# Patient Record
Sex: MALE | Race: WHITE | HISPANIC OR LATINO | Employment: UNEMPLOYED | URBAN - METROPOLITAN AREA
[De-identification: names, ages, dates, MRNs, and addresses within clinical notes are randomized per-mention and may not be internally consistent; named-entity substitution may affect disease eponyms.]

---
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
## 2018-01-04 ENCOUNTER — OFFICE VISIT (OUTPATIENT)
Dept: URGENT CARE | Facility: URGENT CARE | Age: 18
End: 2018-01-04
Payer: COMMERCIAL

## 2018-01-04 VITALS
BODY MASS INDEX: 29.43 KG/M2 | OXYGEN SATURATION: 94 % | TEMPERATURE: 103 F | DIASTOLIC BLOOD PRESSURE: 80 MMHG | SYSTOLIC BLOOD PRESSURE: 127 MMHG | WEIGHT: 199.4 LBS | HEART RATE: 60 BPM

## 2018-01-04 DIAGNOSIS — H65.91 OME (OTITIS MEDIA WITH EFFUSION), RIGHT: ICD-10-CM

## 2018-01-04 DIAGNOSIS — R11.2 INTRACTABLE VOMITING WITH NAUSEA, UNSPECIFIED VOMITING TYPE: Primary | ICD-10-CM

## 2018-01-04 PROCEDURE — 99214 OFFICE O/P EST MOD 30 MIN: CPT | Performed by: PHYSICIAN ASSISTANT

## 2018-01-04 RX ORDER — ONDANSETRON 4 MG/1
4-8 TABLET, ORALLY DISINTEGRATING ORAL EVERY 8 HOURS PRN
Qty: 20 TABLET | Refills: 1 | Status: SHIPPED | OUTPATIENT
Start: 2018-01-04 | End: 2022-11-01

## 2018-01-04 RX ORDER — AMOXICILLIN 875 MG
875 TABLET ORAL 2 TIMES DAILY
Qty: 20 TABLET | Refills: 0 | Status: SHIPPED | OUTPATIENT
Start: 2018-01-04 | End: 2018-01-14

## 2018-01-04 ASSESSMENT — ENCOUNTER SYMPTOMS
MUSCULOSKELETAL NEGATIVE: 1
GASTROINTESTINAL NEGATIVE: 1
EYES NEGATIVE: 1
PSYCHIATRIC NEGATIVE: 1
NEUROLOGICAL NEGATIVE: 1
CARDIOVASCULAR NEGATIVE: 1

## 2018-01-04 NOTE — MR AVS SNAPSHOT
After Visit Summary   1/4/2018    Martín Alvarez    MRN: [id number]           Patient Information     Date Of Birth          2000        Visit Information        Provider Department      1/4/2018 8:00 PM Naomi Carvalho PA-C [facility]        Today's Diagnoses     Intractable vomiting with nausea, unspecified vomiting type    -  1    OME (otitis media with effusion), right           Follow-ups after your visit        Your next 10 appointments already scheduled     Mar 01, 2018  1:00 PM CST   LAB with  LAB   [facility] ([facility])    [address] [place] [address]  1st [facility] [id number] [phone]           Please do not eat 10-12 hours before your appointment if you are coming in fasting for labs on lipids, cholesterol, or glucose (sugar). This does not apply to pregnant women. Water, hot tea and black coffee (with nothing added) are okay. Do not drink other fluids, diet soda or chew gum.            Mar 01, 2018  2:00 PM CST   (Arrive by 1:45 PM)   Return / with Ciro Muñoz MD   [facility] ([facility])    [address] [place] [address]  3rd [facility] [id number] [phone]            Mar 01, 2018  2:40 PM CST   (Arrive by 2:25 PM)   Return Visit with FRANCISCO Benito CNP   [facility] ([facility])    [address] [place] [address]  4th [facility] [id number] [phone]            Mar 02, 2018  1:30 PM CST   (Arrive by 1:15 PM)   Return / with Ciro Muñoz MD   [facility] ([facility])    [address] [place] [address]  3rd [facility] [id number] [phone]              Who to contact     If you have questions or need follow up information about today's clinic visit or your schedule please contact [facility] directly at  "[phone].  Normal or non-critical lab and imaging results will be communicated to you by MyChart, letter or phone within 4 business days after the clinic has received the results. If you do not hear from us within 7 days, please contact the clinic through [facility] or phone. If you have a critical or abnormal lab result, we will notify you by phone as soon as possible.  Submit refill requests through [employer] or call your pharmacy and they will forward the refill request to us. Please allow 3 business days for your refill to be completed.          Additional Information About Your Visit        [employer] Information     [employer] lets you send messages to your doctor, view your test results, renew your prescriptions, schedule appointments and more. To sign up, go to [email or web address] . Click on \"Log in\" on the left side of the screen, which will take you to the Welcome page. Then click on \"Sign up Now\" on the right side of the page.     You will be asked to enter the access code listed below, as well as some personal information. Please follow the directions to create your username and password.     Your access code is: [email or web address]  Expires: 2018  9:03 AM     Your access code will  in 90 days. If you need help or a new code, please call your [place] clinic or [phone].        Care EveryWhere ID     This is your Care EveryWhere ID. This could be used by other organizations to access your [facility] medical records  [id number]        Your Vitals Were     Pulse Temperature Pulse Oximetry BMI (Body Mass Index)          60 103  F (39.4  C) (Oral) 94% 29.43 kg/m2         Blood Pressure from Last 3 Encounters:   18 127/80   17 115/67   17 116/80    Weight from Last 3 Encounters:   18 199 lb 6.4 oz (90.4 kg) (92 %)*   17 195 lb 12.8 oz (88.8 kg) (91 %)*   17 200 lb 9.6 oz (91 kg) (93 %)*     * Growth percentiles are based on CDC 2-20 Years data.              Today, you " had the following     No orders found for display         Today's Medication Changes          These changes are accurate as of: 1/4/18 11:59 PM.  If you have any questions, ask your nurse or doctor.               Start taking these medicines.        Dose/Directions    amoxicillin 875 MG tablet   Commonly known as:  AMOXIL   Used for:  OME (otitis media with effusion), right   Started by:  Naomi Carvalho PA-C        Dose:  875 mg   Take 1 tablet (875 mg) by mouth 2 times daily for 10 days   Quantity:  20 tablet   Refills:  0       ondansetron 4 MG ODT tab   Commonly known as:  ZOFRAN ODT   Used for:  Intractable vomiting with nausea, unspecified vomiting type   Started by:  Naomi Carvalho PA-C        Dose:  4-8 mg   Take 1-2 tablets (4-8 mg) by mouth every 8 hours as needed for nausea   Quantity:  20 tablet   Refills:  1            Where to get your medicines      These medications were sent to [facility] [address] [place], [place] [address] AT [place] [address], [facility] [address]     Phone:  [phone]     amoxicillin 875 MG tablet    ondansetron 4 MG ODT tab                Primary Care Provider Fax #    Provider Not In System [phone]                Equal Access to Services     MINOO PEREZ AH: Hadii lamont ku hadasho Soomaali, waaxda luqadaha, qaybta kaalmada adeegyada, waxay francesin haymarleni marx. So [facility] [phone].    ATENCIÓN: Si habla español, tiene a shirley disposición servicios gratuitos de asistencia lingüística. Llame al [phone].    We comply with applicable federal civil rights laws and [place] laws. We do not discriminate on the basis of race, color, national origin, age, disability, sex, sexual orientation, or gender identity.            Thank you!     Thank you for choosing [facility]  for your care. Our goal is always to provide you with excellent care. Hearing back from our patients is one way  we can continue to improve our services. Please take a few minutes to complete the written survey that you may receive in the mail after your visit with us. Thank you!             Your Updated Medication List - Protect others around you: Learn how to safely use, store and throw away your medicines at www.disposemymeds.org.          This list is accurate as of: 1/4/18 11:59 PM.  Always use your most recent med list.                   Brand Name Dispense Instructions for use Diagnosis    amoxicillin 875 MG tablet    AMOXIL    20 tablet    Take 1 tablet (875 mg) by mouth 2 times daily for 10 days    OME (otitis media with effusion), right       * levETIRAcetam 750 MG tablet    KEPPRA    60 tablet    Take 1 tablet (750 mg) by mouth 2 times daily    Nonintractable generalized idiopathic epilepsy without status epilepticus (H)       * levETIRAcetam 250 MG tablet    KEPPRA    270 tablet    Take 2 tabs at AM along with 750 mg tab ( for total AM dose of 1250 ) and Take 1 tab at Pm along with 750 mg tab ( for total dose of 1000 mg PM dose ).    Nonintractable generalized idiopathic epilepsy without status epilepticus (H)       ondansetron 4 MG ODT tab    ZOFRAN ODT    20 tablet    Take 1-2 tablets (4-8 mg) by mouth every 8 hours as needed for nausea    Intractable vomiting with nausea, unspecified vomiting type       sertraline 100 MG tablet    ZOLOFT    90 tablet    Take 1 tablet (100 mg) by mouth daily    Anxiety       * Notice:  This list has 2 medication(s) that are the same as other medications prescribed for you. Read the directions carefully, and ask your doctor or other care provider to review them with you.

## 2018-01-05 NOTE — NURSING NOTE
"Chief Complaint   Patient presents with     Flu     Patient complains of flu symptoms       Initial /80 (BP Location: Left arm, Patient Position: Chair, Cuff Size: Adult Regular)  Pulse 60  Temp 103  F (39.4  C) (Oral)  Wt 199 lb 6.4 oz (90.4 kg)  SpO2 94%  BMI 29.43 kg/m2 Estimated body mass index is 29.43 kg/(m^2) as calculated from the following:    Height as of 11/28/17: 5' 9.02\" (1.753 m).    Weight as of this encounter: 199 lb 6.4 oz (90.4 kg).  Medication Reconciliation: complete       Yaneth Appiah    "

## 2018-01-05 NOTE — PROGRESS NOTES
SUBJECTIVE:   Martín Alvarez is a 17 year old male presenting with a chief complaint of   Chief Complaint   Patient presents with     Flu     Patient complains of flu symptoms   .    Onset of symptoms was 3 day(s) ago.  Course of illness is worsening.    Severity moderate  Current and Associated symptoms: fever, abdominal pain, sore throat, cough, runny nose  Denies   Treatment measures tried include OTC Cough med  Predisposing factors include None  History of PE tubes? No  Recent antibiotics? No    This started on Tuesday night  Nausea, fever, weak, cough, loss of appetite, vomiting, hard to keep medication down (Keppra), abdominal pain, some diarrhea, he is short of breath and has asthma   He has seizures - last one was in the past month     Review of Systems   Constitutional:        As in HPI   HENT:        As in HPI   Eyes: Negative.    Respiratory:        As in HPI   Cardiovascular: Negative.    Gastrointestinal: Negative.    Genitourinary: Negative.    Musculoskeletal: Negative.    Skin: Negative.    Neurological: Negative.    Psychiatric/Behavioral: Negative.          Past Medical History:   Diagnosis Date     Anxiety      Asthma      Depression      Seizures (H)      Current Outpatient Prescriptions   Medication Sig Dispense Refill     ondansetron (ZOFRAN ODT) 4 MG ODT tab Take 1-2 tablets (4-8 mg) by mouth every 8 hours as needed for nausea 20 tablet 1     amoxicillin (AMOXIL) 875 MG tablet Take 1 tablet (875 mg) by mouth 2 times daily for 10 days 20 tablet 0     levETIRAcetam (KEPPRA) 250 MG tablet Take 2 tabs at AM along with 750 mg tab ( for total AM dose of 1250 ) and Take 1 tab at Pm along with 750 mg tab ( for total dose of 1000 mg PM dose ). 270 tablet 3     levETIRAcetam (KEPPRA) 750 MG tablet Take 1 tablet (750 mg) by mouth 2 times daily 60 tablet 11     sertraline (ZOLOFT) 100 MG tablet Take 1 tablet (100 mg) by mouth daily (Patient taking differently: Take 100 mg by mouth daily ) 90 tablet 1      Social History   Substance Use Topics     Smoking status: Never Smoker     Smokeless tobacco: Never Used     Alcohol use No       OBJECTIVE  /80 (BP Location: Left arm, Patient Position: Chair, Cuff Size: Adult Regular)  Pulse 60  Temp 103  F (39.4  C) (Oral)  Wt 199 lb 6.4 oz (90.4 kg)  SpO2 94%  BMI 29.43 kg/m2    Physical Exam   Constitutional: He is oriented to person, place, and time and well-developed, well-nourished, and in no distress.   HENT:   Head: Normocephalic and atraumatic.   Right Ear: Tympanic membrane and ear canal normal.   Left Ear: Ear canal normal. Tympanic membrane is erythematous and bulging.   Eyes: Conjunctivae and EOM are normal. Pupils are equal, round, and reactive to light.   Neck: Normal range of motion. Neck supple.   Cardiovascular: Normal rate, regular rhythm and normal heart sounds.    Pulmonary/Chest: Effort normal and breath sounds normal.   Abdominal: Soft. Normal appearance. There is no tenderness.   Neurological: He is alert and oriented to person, place, and time. Gait normal.   Skin: Skin is warm and dry.   Psychiatric: Mood and affect normal.       Labs:  No results found for this or any previous visit (from the past 24 hour(s)).        ASSESSMENT:      ICD-10-CM    1. Intractable vomiting with nausea, unspecified vomiting type R11.2 ondansetron (ZOFRAN ODT) 4 MG ODT tab   2. OME (otitis media with effusion), right H65.91 amoxicillin (AMOXIL) 875 MG tablet        Medical Decision Making:    Zofran prescribed so he has better chance of taking Keppra  Amoxicillin for otitis media  Stay hydrated with fever  Close follow up if not improving  Tylenol/ibuprofen for fever    PLAN:    As above    Followup:    If not improving or if condition worsens, follow up with your Primary Care Provider    There are no Patient Instructions on file for this visit.    Naomi Carvalho PA-C

## 2018-01-29 ENCOUNTER — OFFICE VISIT (OUTPATIENT)
Dept: URGENT CARE | Facility: URGENT CARE | Age: 18
End: 2018-01-29
Payer: COMMERCIAL

## 2018-01-29 VITALS
OXYGEN SATURATION: 98 % | HEART RATE: 80 BPM | BODY MASS INDEX: 28.93 KG/M2 | TEMPERATURE: 97.6 F | DIASTOLIC BLOOD PRESSURE: 71 MMHG | SYSTOLIC BLOOD PRESSURE: 115 MMHG | WEIGHT: 196 LBS

## 2018-01-29 DIAGNOSIS — G40.909 RECURRENT SEIZURES (H): ICD-10-CM

## 2018-01-29 DIAGNOSIS — F43.23 ADJUSTMENT DISORDER WITH MIXED ANXIETY AND DEPRESSED MOOD: Primary | ICD-10-CM

## 2018-01-29 PROCEDURE — 36415 COLL VENOUS BLD VENIPUNCTURE: CPT | Performed by: PHYSICIAN ASSISTANT

## 2018-01-29 PROCEDURE — 80177 DRUG SCRN QUAN LEVETIRACETAM: CPT | Mod: 90 | Performed by: PHYSICIAN ASSISTANT

## 2018-01-29 PROCEDURE — 99214 OFFICE O/P EST MOD 30 MIN: CPT | Performed by: PHYSICIAN ASSISTANT

## 2018-01-29 PROCEDURE — 99000 SPECIMEN HANDLING OFFICE-LAB: CPT | Performed by: PHYSICIAN ASSISTANT

## 2018-01-29 NOTE — LETTER
[facility]  [address]  [facility] [address]  Phone: [phone]    January 29, 2018        Martín Alvarez  [address] [facility] [address]          To whom it may concern:    RE: Martín Alvarez    Patient was seen and treated today at our clinic today for panic attacks and recent seizure. Please excuse from work 1/25, 1/26, 1/29/2018.    Please contact me for questions or concerns.      Sincerely,        Marianne Quinn PA-C

## 2018-01-29 NOTE — PROGRESS NOTES
SUBJECTIVE:   Martín Alvarez is a 17 year old male who presents to clinic today for the following health issues:      Abnormal Mood Symptoms      Duration: last Thurs it got worse    Description:  Depression: no   Anxiety: YES  Panic attacks: YES     Accompanying signs and symptoms: see PHQ-9 and PALLAVI scores    History (similar episodes/previous evaluation): hx of anxiety and panic attacks    Precipitating or alleviating factors: None  Therapies tried and outcome: Zoloft 100mg (Sertraline), deep breathing   1/25 at work became very anxious. He told his manager and they sent him to the break room. He feels that they do not believe he has problems with anxiety and seizures.  Had a seizure 1/26/2018 at mom. Mom witnessed it and helped him through it. On Keppra, normal for him is 1 seizure per month. Last seizure before this was 1 month ago.    Current Outpatient Prescriptions   Medication Sig Dispense Refill     levETIRAcetam (KEPPRA) 250 MG tablet Take 2 tabs at AM along with 750 mg tab ( for total AM dose of 1250 ) and Take 1 tab at Pm along with 750 mg tab ( for total dose of 1000 mg PM dose ). 270 tablet 3     levETIRAcetam (KEPPRA) 750 MG tablet Take 1 tablet (750 mg) by mouth 2 times daily 60 tablet 11     sertraline (ZOLOFT) 100 MG tablet Take 1 tablet (100 mg) by mouth daily (Patient taking differently: Take 100 mg by mouth daily ) 90 tablet 1     ondansetron (ZOFRAN ODT) 4 MG ODT tab Take 1-2 tablets (4-8 mg) by mouth every 8 hours as needed for nausea (Patient not taking: Reported on 1/29/2018) 20 tablet 1     Allergies   Allergen Reactions     Seasonal Allergies      Itchy, red eyes         OBJECTIVE:  Blood pressure 115/71, pulse 80, temperature 97.6  F (36.4  C), temperature source Oral, weight 196 lb (88.9 kg), SpO2 98 %.  GENERAL: alert and in no apparent distress  MENTAL STATUS FINDINGS:  denies homicidal or suicidal behavior  PHQ score was 18 today, 12/2014 score was 20.  GAD7 score was 14 here  today. Last score was 18 in the year 2017.    ASSESSMENT:    ICD-10-CM    1. Adjustment disorder with mixed anxiety and depressed mood F43.23 [facility] REFERR[facility]   2. Recurrent seizures (H) G40.909 Keppra (Levetiracetam) Level       PLANS:Will check Keppra level for seizure, last one was 11/2017.  Behavioral Health Specialty referral. If they cannot seem to improve his symptoms he should f/u with Primary Provider or Psych for med adjustment. Also he wanted a work slip saying he has seizures and panic attacks so his work will believe him and not think he is lying. He thinks this will help with the anxiety.    Marianne Quinn PA-C

## 2018-01-29 NOTE — MR AVS SNAPSHOT
After Visit Summary   1/29/2018    Martín Alvarez    MRN: [id number]           Patient Information     Date Of Birth          2000        Visit Information        Provider Department      1/29/2018 3:45 PM Marianne Quinn PA-C [facility]        Today's Diagnoses     Adjustment disorder with mixed anxiety and depressed mood    -  1    Recurrent seizures (H)           Follow-ups after your visit        Additional Services     PRIMARY CARE INTEGRATED BEHAVIORAL HEALTH REFERRAL       Services are provided by a Behavioral Health Clinican (BHC) for [facility] patients' with co-occuring medical / behavioral health needs or mental health / substance use issues referred by Care Team Members (MTM and Care Coordinators)    Services can be provided in person / in clinic or telephonically for the [place]: [place], [place], Integrated Primary Care, and Complex Mobile Care Clinic patients.      Telephone / Consultation support can be provided to Care Team Members for [facility] patients.    BHC's will respond to routine orders within 3-5 business days.  BHC's will provide telephonic support to referred patients as indicated.    ~~~~~~~~~~~~~~~~~~~~~~~~~~~~~~~~~~~~~~~~~~~~~~~~~~~~~~~~~~~~~~~    Care Team Member creating referral: Mary Grace Quinn PA-C    REFERRAL REASON:    Worsening anxiety/panic attacks    Provide additional details for Behavioral Health Clinician to best meet patient's current needs:     Clinic Staff has discussed Behavioral Health Clinician Referral with the Patient/Caregiver: no                  Your next 10 appointments already scheduled     Mar 01, 2018  1:00 PM CST   LAB with  LAB    Health Lab ([facility])    [address] [place] [id number] [phone]           Please do not eat 10-12 hours before your appointment if you are coming in fasting for labs on lipids, cholesterol, or glucose (sugar). This does not apply to  "pregnant women. Water, hot tea and black coffee (with nothing added) are okay. Do not drink other fluids, diet soda or chew gum.            Mar 01, 2018  2:00 PM CST   (Arrive by 1:45 PM)   Return / with Ciro Muñoz MD   [facility] Neurology ([facility])    [address]  [address] [facility] [address]   [phone]            Mar 01, 2018  2:40 PM CST   (Arrive by 2:25 PM)   Return Visit with FRANCISCO Benito [facility] ([facility])    [address]  [address] [facility] [id number] [phone]            Mar 02, 2018  1:30 PM CST   (Arrive by 1:15 PM)   Return / with Ciro Muñoz MD   [facility] Neurology ([facility])    [address] [place] [address]  [address] [facility] [id number] [phone]              Who to contact     If you have questions or need follow up information about today's clinic visit or your schedule please contact [facility] directly at [phone].  Normal or non-critical lab and imaging results will be communicated to you by [email or web address], letter or phone within 4 business days after the clinic has received the results. If you do not hear from us within 7 days, please contact the clinic through [email or web address] or phone. If you have a critical or abnormal lab result, we will notify you by phone as soon as possible.  Submit refill requests through [employer] or call your pharmacy and they will forward the refill request to us. Please allow 3 business days for your refill to be completed.          Additional Information About Your Visit        [email or web address] Information     [employer]ifier lets you send messages to your doctor, view your test results, renew your prescriptions, schedule appointments and more. To sign up, go to [email or web address] . Click on \"Log in\" on the left side of the screen, which will take " "you to the Welcome page. Then click on \"Sign up Now\" on the right side of the page.     You will be asked to enter the access code listed below, as well as some personal information. Please follow the directions to create your username and password.     Your access code is: [email or web address]  Expires: 2018  9:03 AM     Your access code will  in 90 days. If you need help or a new code, please call your [facility] clinic or [phone].        Care EveryWhere ID     This is your Care EveryWhere ID. This could be used by other organizations to access your [facility] medical records  [id number]        Your Vitals Were     Pulse Temperature Pulse Oximetry BMI (Body Mass Index)          80 97.6  F (36.4  C) (Oral) 98% 28.93 kg/m2         Blood Pressure from Last 3 Encounters:   18 115/71   18 127/80   17 115/67    Weight from Last 3 Encounters:   18 196 lb (88.9 kg) (90 %)*   18 199 lb 6.4 oz (90.4 kg) (92 %)*   17 195 lb 12.8 oz (88.8 kg) (91 %)*     * Growth percentiles are based on CDC 2-20 Years data.              We Performed the Following     Keppra (Levetiracetam) Level     PRIMARY CARE [facility]TEGRATED [facility]HAV[facility]AL HE[facility] REFERRAL        Primary Care Provider Fax #    Provider Not In System [phone]                Equal Access to Services     TAMMI PEREZ : Hadii lamont martinezo Socorazon, waaxda luqadaha, qaybta kaalmada shruti, sudha gonzalez . So [facility] [phone].    ATENCIÓN: Si habla español, tiene a shirley disposición servicios gratuitos de asistencia lingüística. Llame al [phone].    We comply with applicable federal civil rights laws and [place] laws. We do not discriminate on the basis of race, color, national origin, age, disability, sex, sexual orientation, or gender identity.            Thank you!     Thank you for choosing [facility]  for your care. Our goal is always to provide you with excellent care. Hearing back " from our patients is one way we can continue to improve our services. Please take a few minutes to complete the written survey that you may receive in the mail after your visit with us. Thank you!             Your Updated Medication List - Protect others around you: Learn how to safely use, store and throw away your medicines at www.disposemymeds.org.          This list is accurate as of 1/29/18  4:23 PM.  Always use your most recent med list.                   Brand Name Dispense Instructions for use Diagnosis    * levETIRAcetam 750 MG tablet    KEPPRA    60 tablet    Take 1 tablet (750 mg) by mouth 2 times daily    Nonintractable generalized idiopathic epilepsy without status epilepticus (H)       * levETIRAcetam 250 MG tablet    KEPPRA    270 tablet    Take 2 tabs at AM along with 750 mg tab ( for total AM dose of 1250 ) and Take 1 tab at Pm along with 750 mg tab ( for total dose of 1000 mg PM dose ).    Nonintractable generalized idiopathic epilepsy without status epilepticus (H)       ondansetron 4 MG ODT tab    ZOFRAN ODT    20 tablet    Take 1-2 tablets (4-8 mg) by mouth every 8 hours as needed for nausea    Intractable vomiting with nausea, unspecified vomiting type       sertraline 100 MG tablet    ZOLOFT    90 tablet    Take 1 tablet (100 mg) by mouth daily    Anxiety       * Notice:  This list has 2 medication(s) that are the same as other medications prescribed for you. Read the directions carefully, and ask your doctor or other care provider to review them with you.

## 2018-01-31 LAB — LEVETIRACETAM SERPL-MCNC: 5 UG/ML (ref 12–46)

## 2018-02-22 DIAGNOSIS — F41.9 ANXIETY: ICD-10-CM

## 2018-02-22 NOTE — TELEPHONE ENCOUNTER
"Requested Prescriptions   Pending Prescriptions Disp Refills     sertraline (ZOLOFT) 100 MG tablet [Pharmacy Med Name: SERTRALINE 100MG TABLETS] 90 tablet 0    Last Written Prescription Date:  6/19/17  Last Fill Quantity: 90,  # refills: 1   Last office visit: 6/19/2017 with prescribing provider:     Future Office Visit:     Sig: TAKE 1 TABLET BY MOUTH DAILY    SSRIs Protocol Passed    2/22/2018  3:18 PM       Passed - Recent or future visit with authorizing provider    Patient had office visit in the last year or has a visit in the next 30 days with authorizing provider.  See \"Patient Info\" tab in inbasket, or \"Choose Columns\" in Meds & Orders section of the refill encounter.            Passed - Patient is age 18 or older          "

## 2018-02-23 RX ORDER — SERTRALINE HYDROCHLORIDE 100 MG/1
TABLET, FILM COATED ORAL
Qty: 30 TABLET | Refills: 0 | Status: SHIPPED | OUTPATIENT
Start: 2018-02-23 | End: 2018-03-01

## 2018-02-23 NOTE — TELEPHONE ENCOUNTER
Patient has not been seen by Dr. Mcnair since 6/19/17. No plan noted.   It looks like another RN refilled today for a 30 day natalio and added a noted saying he needs to be seen soon.    Trish Taveras RN  [facility]

## 2018-03-01 ENCOUNTER — OFFICE VISIT (OUTPATIENT)
Dept: INTERNAL MEDICINE | Facility: CLINIC | Age: 18
End: 2018-03-01
Payer: COMMERCIAL

## 2018-03-01 ENCOUNTER — OFFICE VISIT (OUTPATIENT)
Dept: NEUROLOGY | Facility: CLINIC | Age: 18
End: 2018-03-01
Payer: COMMERCIAL

## 2018-03-01 VITALS
SYSTOLIC BLOOD PRESSURE: 105 MMHG | RESPIRATION RATE: 20 BRPM | BODY MASS INDEX: 30.16 KG/M2 | HEART RATE: 65 BPM | DIASTOLIC BLOOD PRESSURE: 66 MMHG | OXYGEN SATURATION: 92 % | WEIGHT: 204.2 LBS

## 2018-03-01 VITALS — HEIGHT: 69 IN | BODY MASS INDEX: 28.14 KG/M2 | WEIGHT: 190 LBS

## 2018-03-01 DIAGNOSIS — Z23 NEED FOR HPV VACCINE: ICD-10-CM

## 2018-03-01 DIAGNOSIS — Z23 NEED FOR HPV VACCINATION: Primary | ICD-10-CM

## 2018-03-01 DIAGNOSIS — G40.209 PARTIAL SYMPTOMATIC EPILEPSY WITH COMPLEX PARTIAL SEIZURES, NOT INTRACTABLE, WITHOUT STATUS EPILEPTICUS (H): Primary | ICD-10-CM

## 2018-03-01 DIAGNOSIS — F41.9 ANXIETY: ICD-10-CM

## 2018-03-01 DIAGNOSIS — G40.309 NONINTRACTABLE GENERALIZED IDIOPATHIC EPILEPSY WITHOUT STATUS EPILEPTICUS (H): ICD-10-CM

## 2018-03-01 RX ORDER — LEVETIRACETAM 250 MG/1
TABLET ORAL
Qty: 270 TABLET | Refills: 11 | Status: SHIPPED | OUTPATIENT
Start: 2018-03-01 | End: 2018-03-06

## 2018-03-01 RX ORDER — SERTRALINE HYDROCHLORIDE 100 MG/1
100 TABLET, FILM COATED ORAL DAILY
Qty: 90 TABLET | Refills: 3 | Status: SHIPPED | OUTPATIENT
Start: 2018-03-01 | End: 2019-03-19

## 2018-03-01 ASSESSMENT — ENCOUNTER SYMPTOMS
SEIZURES: 0
MEMORY LOSS: 0
PANIC: 1
INSOMNIA: 0
DECREASED CONCENTRATION: 0
PARALYSIS: 0
TINGLING: 0
HEADACHES: 1
DIZZINESS: 1
SPEECH CHANGE: 0
NUMBNESS: 0
DEPRESSION: 0
NERVOUS/ANXIOUS: 1
DISTURBANCES IN COORDINATION: 1
LOSS OF CONSCIOUSNESS: 0
WEAKNESS: 1

## 2018-03-01 ASSESSMENT — PAIN SCALES - GENERAL: PAINLEVEL: MODERATE PAIN (4)

## 2018-03-01 NOTE — PROGRESS NOTES
"[facility] [place]   Jewell PATELRufino LiangFRANCISCO camara CNP  03/01/2018        Chief Complaint:   6 Month Follow-Up.      History of Present Illness:   Martín Alvarez is a 17 year old male with a history of anxiety, depression, seizures, and asthma among others who presents for his 6 month follow-up after establishing care. I last evaluated the patient on 9/14/2017 at which time we reviewed his health history and routine laboratory tests were ordered; please see this note for further details. The patient states his grandmother returned from [facility] two months ago and he is very happy about this. She is here today in the waiting room.     Back Pain: Patient is complaining of diffuse low back pain beginning several months ago. He typically awakes in the morning with this. He voices he usually sleeps on his stomach and occasionally on his back at night. The pain often resolves after he gets up, moves around, and stretches in the morning. He is no longer lifting heavy boxes at work secondary to his low back pain. The pain is non-radiating in nature and he denies any other associated symptoms. Tylenol use is helpful for pain control.     Anxiety: Patient states he started having what he describes as panic attacks a few months ago. These did not improve or worsen when his grandmother returned from [facility]. He has been on sertraline for \"a long time\" and his dosage has not changed. Around this time of year, several years ago, the patient's uncle passed away from cancer and this caused a large amount of stress between his family members. Since his death, he has noticed worsened anxiety and panic attacks in the spring. He does not think the panic attacks are severe enough to warrant a visit with a psychiatrist. He is not interested in making any medication changes. He is due for a refill of his sertraline today.     Headaches: Occasionally he experiences headaches. He was exposed to carbon monoxide for several hours " at work 1-2 months ago at work which caused nausea and a headache. Since this time, he has noticed intermittent headaches that are typically relieved with Tylenol or Advil. He estimates he is suffering from headaches 4-5 days out of the week. The headaches do not interfere with his ability to go to work.     Other concerns discussed:  1. He is due today for the Gardasil vaccination.   2. He is no longer experiencing nausea.   3. He denies any sleep difficulties.   4. Asthma is currently well-controlled.   5. The patient's last seizure was one month ago; he denies any changes.      Immunizations:   Most Recent Immunizations   Administered Date(s) Administered     DTAP (<7y) 05/02/2003     HEPA 11/30/2007     HPV 01/18/2012     HepB 04/14/1999     Hib (PRP-T) 06/13/2001     Influenza (H1N1) 01/14/2010     Influenza (IIV3) PF 01/18/2012     Influenza Vaccine IM 3yrs+ 4 Valent IIV4 10/16/2013     MMR 05/02/2003     Meningococcal (Menactra ) 07/02/2009     Pneumococcal (PCV 7) 06/13/2001     Poliovirus, inactivated (IPV) 05/02/2003     TD (ADULT, 7+) 11/30/2007     TDAP Vaccine (Adacel) 07/02/2009     Varicella 11/30/2007     Recent Labs:   Component      Latest Ref Rng & Units 11/28/2017 1/29/2018   Sodium      133 - 144 mmol/L 138    Potassium      3.4 - 5.3 mmol/L 3.9    Chloride      98 - 110 mmol/L 104    Carbon Dioxide      20 - 32 mmol/L 24    Anion Gap      3 - 14 mmol/L 9    Glucose      70 - 99 mg/dL 95    Urea Nitrogen      7 - 30 mg/dL 15    Creatinine      0.50 - 1.00 mg/dL 0.82    GFR Estimate      >60 mL/min/1.7m2 >90    GFR Estimate If Black      >60 mL/min/1.7m2 >90    Calcium      8.5 - 10.1 mg/dL 9.5    WBC      4.0 - 11.0 10e9/L 6.5    RBC Count      4.4 - 5.9 10e12/L 5.75    Hemoglobin      13.3 - 17.7 g/dL 16.2    Hematocrit      40.0 - 53.0 % 47.5    MCV      78 - 100 fl 83    MCH      26.5 - 33.0 pg 28.2    MCHC      31.5 - 36.5 g/dL 34.1    RDW      10.0 - 15.0 % 12.4    Platelet Count      150 -  450 10e9/L 202    Cholesterol      <170 mg/dL 189 (H)    Triglycerides      <90 mg/dL 116 (H)    HDL Cholesterol      >45 mg/dL 44 (L)    LDL Cholesterol Calculated      <110 mg/dL 122 (H)    Non HDL Cholesterol      <120 mg/dL 145 (H)    Keppra (Levetiracetam) Level      12 - 46 ug/mL 13 5 (L)     Review of Systems:   Pertinent items are noted in HPI.  All other systems are negative.    Active Medications:       levETIRAcetam (KEPPRA) 250 MG tablet, Take 2 tabs of 250 AM  with 750 mg tab and Take 1 tab at Pm with 750 mg tab ( dose of 1000 mg PM)., Disp: 270 tablet, Rfl: 11     sertraline (ZOLOFT) 100 MG tablet, TAKE 1 TABLET BY MOUTH DAILY, Disp: 30 tablet, Rfl: 0     ondansetron (ZOFRAN ODT) 4 MG ODT tab, Take 1-2 tablets (4-8 mg) by mouth every 8 hours as needed for nausea (Patient not taking: Reported on 1/29/2018), Disp: 20 tablet, Rfl: 1     levETIRAcetam (KEPPRA) 750 MG tablet, Take 1 tablet (750 mg) by mouth 2 times daily, Disp: 60 tablet, Rfl: 11      Allergies:   Seasonal allergies.      Past Medical History:  Anxiety.   Asthma.   Seizures.   Headache.   Seasonal allergic rhinitis.   Myopia.   Academic underachievement.   Amblyopia.   Esotropia.   Adjustment disorder with anxiety.   Outbursts of anger.   Visual disturbance.   Chronic allergic conjunctivitis.   Homelessness.   Acne vulgaris.   Major depressive disorder.   Anisometropia.   Hyperopia with astigmatism.      Past Surgical History:  Tonsillectomy and adenoidectomy.     Family History:   Unspecified relative: Positive for brain cancer.      Social History:   Non-smoker. No history of alcohol use or substance abuse. He is currently living with his uncle.      Physical Exam:   /66 (BP Location: Right arm, Patient Position: Sitting, Cuff Size: Adult Regular)  Pulse 65  Resp 20  Wt 92.6 kg (204 lb 3.2 oz)  SpO2 92%  BMI 30.16 kg/m2   Wt Readings from Last 1 Encounters:   03/01/18 92.6 kg (204 lb 3.2 oz) (93 %)*        Constitutional: no  distress, comfortable, pleasant   Eyes: anicteric, conjunctiva pink, PERRLA  Ears, Nose and Throat: tympanic membranes clear, nose clear and free of lesions, throat clear.   Cardiovascular: regular rate and rhythm, normal S1 and S2, no murmurs, rubs or gallops, peripheral pulses full and symmetric   Respiratory: clear to auscultation, no wheezes or crackles, normal breath sounds   Gastrointestinal: positive bowel sounds, nontender, no hepatosplenomegaly, no masses   Musculoskeletal: full range of motion, no edema   Skin: no concerning lesions, no jaundice, temp normal   Neurological: normal gait, normal speech, no tremor. A and O x 3, good historian.  Psychological: appropriate mood, good eye contact, normal affect        Assessment and Plan:  Martín was seen today for an immunization and back pain.    Diagnoses and all orders for this visit:    Need for HPV vaccination:  The second injection in a series of three Gardasil injections was performed today in clinic.     -     HPV VACCINE (GARDASIL 9), 9 VALENT    Will have him return in 4 months for #3 HPV.    Back Pain:   He will continue to manage this symptomatically with activity modification at work, as well as over-the-counter pain medications. He is not interested in seeing a physical therapist at this time.     Headaches:   He will continue to monitor the severity and frequency of his headaches. Over-the-counter pain medications seem to be working and he will continue using Tylenol or ibuprofen as needed.    Anxiety  The patient was provided a refill of his Zoloft. He is not interested in meeting with a psychiatrist at this time though this was offered to him.     -     sertraline (ZOLOFT) 100 MG tablet; Take 1 tablet (100 mg) by mouth daily     Follow-up: Return in four months for Gardasil number three. Nurse visit only. Return to see me in one year.         Scribe Disclosure:   I, Laila Chan, am serving as a scribe to document services personally  performed by FRANCISCO Ferrari CNP at this visit, based upon the provider's statements to me. All documentation has been reviewed by the aforementioned provider prior to being entered into the official medical record.     Portions of this medical record were completed by a scribe. UPON MY REVIEW AND AUTHENTICATION BY ELECTRONIC SIGNATURE, this confirms (a) I performed the applicable clinical services, and (b) the record is accurate.   Jewell SINGH, CNP

## 2018-03-01 NOTE — NURSING NOTE
HPV shot given without problems, patient tolerated procedure well.Trudi Appiah LPN 3:58 PM on 3/1/2018

## 2018-03-01 NOTE — MR AVS SNAPSHOT
After Visit Summary   3/1/2018    Martín Alvarez    MRN: [id number]           Patient Information     Date Of Birth          2000        Visit Information        Provider Department      3/1/2018 2:40 PM Jewell Naidu, APRN CNP [facility]        Today's Diagnoses     Need for HPV vaccination    -  1    Need for HPV vaccine        Anxiety          Care Instructions    Primary Care Center: [phone]     Primary Care Center Medication Refill Request Information:  * Please contact your pharmacy regarding ANY request for medication refills.  ** [facility] Prescription Fax = [phone]  * Please allow 3 business days for routine medication refills.  * Please allow 5 business days for controlled substance medication refills.     Primary Care Center Test Result notification information:  *You will be notified with in 7-10 days of your appointment day regarding the results of your test.  If you are on MyChart you will be notified as soon as the provider has reviewed the results and signed off on them.          Follow-ups after your visit        Follow-up notes from your care team     Return in about 1 year (around 3/1/2019).      Your next 10 appointments already scheduled     Jun 07, 2018  2:00 PM CDT   (Arrive by 1:45 PM)   Return / with Ciro Muñoz MD   [facility] Neurology ([facility] [place])    [address] [place] [address]  3rd [facility] [id number] [phone]            Jul 06, 2018  2:30 PM CDT   Nurse Visit with  Pcc Nurse   [facility] ([facility] [place])    [address] [place] [id number] [phone]              Future tests that were ordered for you today     Open Future Orders        Priority Expected Expires Ordered    HPV VACCINE (GARDASIL 9), 9 VALENT Routine 7/2/2018 3/1/2019 3/1/2018            Who to contact     Please call your clinic at  [phone] to:    Ask questions about your health    Make or cancel appointments    Discuss your medicines    Learn about your test results    Speak to your doctor            Additional Information About Your Visit        NoviMedicinehart Information     PerformYard is an electronic gateway that provides easy, online access to your medical records. With PerformYard, you can request a clinic appointment, read your test results, renew a prescription or communicate with your care team.     To sign up for PerformYard visit the website at [email or web address]   You will be asked to enter the access code listed below, as well as some personal information. Please follow the directions to create your username and password.     Your access code is: [email or web address]  Expires: 2018  9:03 AM     Your access code will  in 90 days. If you need help or a new code, please contact your [facility] or call [phone] for assistance.        Care EveryWhere ID     This is your Care EveryWhere ID. This could be used by other organizations to access your [place] medical records  [id number]        Your Vitals Were     Pulse Respirations Pulse Oximetry BMI (Body Mass Index)          65 20 92% 30.16 kg/m2         Blood Pressure from Last 3 Encounters:   18 105/66   18 115/71   18 127/80    Weight from Last 3 Encounters:   18 92.6 kg (204 lb 3.2 oz) (93 %)*   18 86.2 kg (190 lb) (87 %)*   18 88.9 kg (196 lb) (90 %)*     * Growth percentiles are based on [facility] 2-20 Years data.              We Performed the Following     C HUMAN PAPILLOMA VIRUS VACCINE (GARDASIL 9) 3 DOSE IM     HPV VACCINE (GARDASIL 9), 9 VALENT          Today's Medication Changes          These changes are accurate as of 3/1/18  3:44 PM.  If you have any questions, ask your nurse or doctor.               These medicines have changed or have updated prescriptions.        Dose/Directions    * levETIRAcetam 750 MG  tablet   Commonly known as:  KEPPRA   This may have changed:  Another medication with the same name was changed. Make sure you understand how and when to take each.   Used for:  Nonintractable generalized idiopathic epilepsy without status epilepticus (H)   Changed by:  Ciro Muñoz MD        Dose:  750 mg   Take 1 tablet (750 mg) by mouth 2 times daily   Quantity:  60 tablet   Refills:  11       * levETIRAcetam 250 MG tablet   Commonly known as:  KEPPRA   This may have changed:  additional instructions   Used for:  Nonintractable generalized idiopathic epilepsy without status epilepticus (H)   Changed by:  Ciro Muñoz MD        Take 2 tabs of 250 AM  with 750 mg tab and Take 1 tab at Pm with 750 mg tab ( dose of 1000 mg PM).   Quantity:  270 tablet   Refills:  11       sertraline 100 MG tablet   Commonly known as:  ZOLOFT   This may have changed:  See the new instructions.   Used for:  Anxiety   Changed by:  Jewell Naidu APRN CNP        Dose:  100 mg   Take 1 tablet (100 mg) by mouth daily   Quantity:  90 tablet   Refills:  3       * Notice:  This list has 2 medication(s) that are the same as other medications prescribed for you. Read the directions carefully, and ask your doctor or other care provider to review them with you.         Where to get your medicines      These medications were sent to [facility] [id number] [place], [place] [address] AT [place] [address], [facility] [address]     Phone:  [phone]     levETIRAcetam 250 MG tablet    sertraline 100 MG tablet                Primary Care Provider Fax #    Provider Not In System [phone]                Equal Access to Services     [facility] AH: Hadii lamont candelaria Socorazon, waaxda luqadaha, qaybta kaalmada shruti, sudha marx. So [facility] [phone].    ATENCIÓN: Si habla español, tiene a shirley disposición servicios gratuitos de asistencia lingüística. Llame al  [phone].    We comply with applicable federal civil rights laws and [place] laws. We do not discriminate on the basis of race, color, national origin, age, disability, sex, sexual orientation, or gender identity.            Thank you!     Thank you for choosing [facility]  for your care. Our goal is always to provide you with excellent care. Hearing back from our patients is one way we can continue to improve our services. Please take a few minutes to complete the written survey that you may receive in the mail after your visit with us. Thank you!             Your Updated Medication List - Protect others around you: Learn how to safely use, store and throw away your medicines at www.disposemymeds.org.          This list is accurate as of 3/1/18  3:44 PM.  Always use your most recent med list.                   Brand Name Dispense Instructions for use Diagnosis    * levETIRAcetam 750 MG tablet    KEPPRA    60 tablet    Take 1 tablet (750 mg) by mouth 2 times daily    Nonintractable generalized idiopathic epilepsy without status epilepticus (H)       * levETIRAcetam 250 MG tablet    KEPPRA    270 tablet    Take 2 tabs of 250 AM  with 750 mg tab and Take 1 tab at Pm with 750 mg tab ( dose of 1000 mg PM).    Nonintractable generalized idiopathic epilepsy without status epilepticus (H)       ondansetron 4 MG ODT tab    ZOFRAN ODT    20 tablet    Take 1-2 tablets (4-8 mg) by mouth every 8 hours as needed for nausea    Intractable vomiting with nausea, unspecified vomiting type       sertraline 100 MG tablet    ZOLOFT    90 tablet    Take 1 tablet (100 mg) by mouth daily    Anxiety       * Notice:  This list has 2 medication(s) that are the same as other medications prescribed for you. Read the directions carefully, and ask your doctor or other care provider to review them with you.

## 2018-03-01 NOTE — PATIENT INSTRUCTIONS
[facility]: [phone]     [facility] Medication Refill Request Information:  * Please contact your pharmacy regarding ANY request for medication refills.  ** [facility] Prescription Fax = [phone]  * Please allow 3 business days for routine medication refills.  * Please allow 5 business days for controlled substance medication refills.     [facility] Test Result notification information:  *You will be notified with in 7-10 days of your appointment day regarding the results of your test.  If you are on MyChart you will be notified as soon as the provider has reviewed the results and signed off on them.

## 2018-03-01 NOTE — PATIENT INSTRUCTIONS
Mr [facility]    This is to certify that Martín Mast Antonio has clinic appointment at the [facility] that he has to go to and please allow him to keep those appointments up  Thanks Ciro crum M.D    March 1, 2018.

## 2018-03-01 NOTE — PROGRESS NOTES
Answers for HPI/ROS submitted by the patient on 3/1/2018   General Symptoms: No  Skin Symptoms: No  HENT Symptoms: No  EYE SYMPTOMS: No  HEART SYMPTOMS: No  LUNG SYMPTOMS: No  INTESTINAL SYMPTOMS: No  URINARY SYMPTOMS: No  REPRODUCTIVE SYMPTOMS: No  SKELETAL SYMPTOMS: No  BLOOD SYMPTOMS: No  NERVOUS SYSTEM SYMPTOMS: Yes  MENTAL HEALTH SYMPTOMS: Yes  PEDS Symptoms: No  Trouble with coordination: Yes  Dizziness or trouble with balance: Yes  Fainting or black-out spells: No  Memory loss: No  Headache: Yes  Seizures: No  Speech problems: No  Tingling: No  Weakness: Yes  Difficulty walking: No  Paralysis: No  Numbness: No  Nervous or Anxious: Yes  Depression: No  Trouble sleeping: No  Trouble thinking or concentrating: No  Mood changes: Yes  Panic attacks: Yes

## 2018-03-01 NOTE — MR AVS SNAPSHOT
After Visit Summary   3/1/2018    Martín Alvarez    MRN: [id number]           Patient Information     Date Of Birth          2000        Visit Information        Provider Department      3/1/2018 2:00 PM Ciro Crum MD [facility]        Today's Diagnoses     Partial symptomatic epilepsy with complex partial seizures, not intractable, without status epilepticus (H)    -  1    Nonintractable generalized idiopathic epilepsy without status epilepticus (H)          Care Instructions    [facility]    This is to certify that Martín Alvarez has clinic appointment at the [facility] that he has to go to and please allow him to keep those appointments up  Thanks Ciro crum M.D    March 1, 2018.            Follow-ups after your visit        Follow-up notes from your care team     Return in about 3 months (around 6/1/2018).      Your next 10 appointments already scheduled     Mar 01, 2018  2:40 PM CST   (Arrive by 2:25 PM)   Return Visit with FRANCISCO [facility] ([facility] [place])    [address] [place] [id number] [phone]            Jun 07, 2018  2:00 PM CDT   (Arrive by 1:45 PM)   Return / with Ciro Crum MD   [facility] ([facility] [place])    [address] [place] [id number] [phone]              Who to contact     Please call your clinic at [phone] to:    Ask questions about your health    Make or cancel appointments    Discuss your medicines    Learn about your test results    Speak to your doctor            Additional Information About Your Visit        Cloakroom Information     Cloakroom is an electronic gateway that provides easy, online access to your medical records. With Cloakroom, you can request a clinic appointment, read your test results, renew a prescription or communicate with  "your care team.     To sign up for Rentlordhart visit the website at [email or web address]   You will be asked to enter the access code listed below, as well as some personal information. Please follow the directions to create your username and password.     Your access code is: [email or web address]  Expires: 2018  9:03 AM     Your access code will  in 90 days. If you need help or a new code, please contact your [facility] or call [phone] for assistance.        Care EveryWhere ID     This is your Care EveryWhere ID. This could be used by other organizations to access your [place] medical records  [id number]        Your Vitals Were     Height BMI (Body Mass Index)                1.753 m (5' 9\") 28.06 kg/m2           Blood Pressure from Last 3 Encounters:   18 115/71   18 127/80   17 115/67    Weight from Last 3 Encounters:   18 86.2 kg (190 lb) (87 %)*   18 88.9 kg (196 lb) (90 %)*   18 90.4 kg (199 lb 6.4 oz) (92 %)*     * Growth percentiles are based on [facility] 2-20 Years data.              We Performed the Following     Keppra (Levetiracetam) Level          Today's Medication Changes          These changes are accurate as of 3/1/18  2:32 PM.  If you have any questions, ask your nurse or doctor.               These medicines have changed or have updated prescriptions.        Dose/Directions    * levETIRAcetam 750 MG tablet   Commonly known as:  KEPPRA   This may have changed:  Another medication with the same name was changed. Make sure you understand how and when to take each.   Used for:  Nonintractable generalized idiopathic epilepsy without status epilepticus (H)   Changed by:  Ciro Muñoz MD        Dose:  750 mg   Take 1 tablet (750 mg) by mouth 2 times daily   Quantity:  60 tablet   Refills:  11       * levETIRAcetam 250 MG tablet   Commonly known as:  KEPPRA   This may have changed:  additional instructions   Used for:  " Nonintractable generalized idiopathic epilepsy without status epilepticus (H)   Changed by:  Ciro Muñoz MD        Take 2 tabs of 250 AM  with 750 mg tab and Take 1 tab at Pm with 750 mg tab ( dose of 1000 mg PM).   Quantity:  270 tablet   Refills:  11       * Notice:  This list has 2 medication(s) that are the same as other medications prescribed for you. Read the directions carefully, and ask your doctor or other care provider to review them with you.         Where to get your medicines      These medications were sent to [id number] - [place], [place] - [facility] AT [place] [address]  [address], [facility] [address]     Phone:  [phone]     levETIRAcetam 250 MG tablet                Primary Care Provider Fax #    Provider Not In System [phone]                Equal Access to Services     MINOO PEREZ : Shivam Peña, waaxgregory maloney, qadeandre kaalmagregory bahena, sudha marx. So [facility] [phone].    ATENCIÓN: Si habla español, tiene a shirley disposición servicios gratuitos de asistencia lingüística. Jacqueame al [phone].    We comply with applicable federal civil rights laws and [place] laws. We do not discriminate on the basis of race, color, national origin, age, disability, sex, sexual orientation, or gender identity.            Thank you!     Thank you for choosing [facility]UR[facility]  for your care. Our goal is always to provide you with excellent care. Hearing back from our patients is one way we can continue to improve our services. Please take a few minutes to complete the written survey that you may receive in the mail after your visit with us. Thank you!             Your Updated Medication List - Protect others around you: Learn how to safely use, store and throw away your medicines at www.disposemymeds.org.          This list is accurate as of 3/1/18  2:32 PM.  Always use your most recent med list.                    Brand Name Dispense Instructions for use Diagnosis    * levETIRAcetam 750 MG tablet    KEPPRA    60 tablet    Take 1 tablet (750 mg) by mouth 2 times daily    Nonintractable generalized idiopathic epilepsy without status epilepticus (H)       * levETIRAcetam 250 MG tablet    KEPPRA    270 tablet    Take 2 tabs of 250 AM  with 750 mg tab and Take 1 tab at Pm with 750 mg tab ( dose of 1000 mg PM).    Nonintractable generalized idiopathic epilepsy without status epilepticus (H)       ondansetron 4 MG ODT tab    ZOFRAN ODT    20 tablet    Take 1-2 tablets (4-8 mg) by mouth every 8 hours as needed for nausea    Intractable vomiting with nausea, unspecified vomiting type       sertraline 100 MG tablet    ZOLOFT    30 tablet    TAKE 1 TABLET BY MOUTH DAILY    Anxiety       * Notice:  This list has 2 medication(s) that are the same as other medications prescribed for you. Read the directions carefully, and ask your doctor or other care provider to review them with you.

## 2018-03-02 NOTE — PROGRESS NOTES
Service Date: 03/01/2018      INTERVAL HISTORY:   Mr. 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  His last concentration with me was in 01/29 of this year and it was subtherapeutic at 5.  He actually had a seizure at that time.  He says his compliance has been variable.  He said he was exposed to carbon monoxide at the bakery, but this seems to be relatively minor and probably not significant, but he did have a seizure the next day.  He has been talked about taking his medications correctly and promptly and he has taken them erratically.  There are some psychiatric issues and he has a diagnosis of major depressive disorder but cannot get to his therapy appointments because of he says his employer would not let him out.  He saw Benjamin Hernandez on 09/29 and has seen on the 29th of this month Marianne Quinn, Physician Assistant, and she was concerned about his seizures.  We saw him today for his seizures and we will check his concentration today.  Once again, we went over the compliance issue.  He is present with his grandmother with some of the family issues.  He has been on Zoloft 100 mg a day and still having anxiety.  He is having about 1 seizure per month.  He carries a diagnosis of adjustment disorder with mixed anxiety and depressed mood.  He may need to be seen by Psych.  We will discuss with the psychologist who has been seeing him.      He is still feeling down and depressed.  He denies suicidal thinking today.      PHYSICAL EXAMINATION:   GENERAL:   On exam, he looks okay.  He interacts with grandmother briefly.  There are family issues.  He shows no signs of toxicity.  Vital signs are good.        We will obtain a concentration today and talk with the therapist and see how we can assist him more with his major depression.  He might need adjustment  of the SSRIs, which are not a problem with the Keppra.  He is not driving.  Seizure control needs to be optimized.  We might need to increase his dose.      MD OZIEL Vang MD             D: 2018   T: 2018   MT: AKA      Name:     CUATE GRACIA   MRN:      [id number]        Account:      [id number]   :      2000           Service Date: 2018      Document: [id number]

## 2018-03-03 LAB — LEVETIRACETAM SERPL-MCNC: 2 UG/ML (ref 12–46)

## 2018-03-06 ENCOUNTER — TELEPHONE (OUTPATIENT)
Dept: NEUROLOGY | Facility: CLINIC | Age: 18
End: 2018-03-06

## 2018-03-06 DIAGNOSIS — G40.309 NONINTRACTABLE GENERALIZED IDIOPATHIC EPILEPSY WITHOUT STATUS EPILEPTICUS (H): ICD-10-CM

## 2018-03-06 RX ORDER — LEVETIRACETAM 250 MG/1
TABLET ORAL
Qty: 360 TABLET | Refills: 3 | Status: SHIPPED | OUTPATIENT
Start: 2018-03-06 | End: 2019-06-24

## 2018-03-06 NOTE — TELEPHONE ENCOUNTER
Received message from DR. Muñoz:    needs increase LVTM to 1250 bid. Please call an dorder. thanks  Received: Today       Ciro Muñoz MD Chapin, Jet, RN     Patient called with instructions, spoke with Mother who is in agreement.

## 2018-03-07 ENCOUNTER — TELEPHONE (OUTPATIENT)
Dept: INTERNAL MEDICINE | Facility: CLINIC | Age: 18
End: 2018-03-07

## 2018-03-07 NOTE — TELEPHONE ENCOUNTER
Visit Activities (Refresh list every visit): Phone Encounter    I called Martín at Dr Muñoz's request to offer [facility] support/services.    Spoke with his mother who has met me before.  Arranged for an appointment for Monday the 12th at 10am.        Benjamin Hernandez MA, LM  Lead Behavioral Health Clinician  [facility]    [email or web address]       Phone: [phone] (mobility extension)  Pager: [phone]

## 2018-03-23 DIAGNOSIS — F41.9 ANXIETY: ICD-10-CM

## 2018-03-23 NOTE — TELEPHONE ENCOUNTER
"Requested Prescriptions   Pending Prescriptions Disp Refills     sertraline (ZOLOFT) 100 MG tablet [Pharmacy Med Name: SERTRALINE 100MG TABLETS] 30 tablet 0    Last Written Prescription Date:  3-1-18  Last Fill Quantity: 90,  # refills: 3   Last office visit: 6/19/2017 with prescribing provider:     Future Office Visit:     Sig: TAKE 1 TABLET BY MOUTH DAILY    SSRIs Protocol Passed    3/23/2018  2:12 PM       Passed - Recent (12 mo) or future (30 days) visit within the authorizing provider's specialty    Patient had office visit in the last 12 months or has a visit in the next 30 days with authorizing provider or within the authorizing provider's specialty.  See \"Patient Info\" tab in inbasket, or \"Choose Columns\" in Meds & Orders section of the refill encounter.           Passed - Patient is age 18 or older          "

## 2018-03-27 RX ORDER — SERTRALINE HYDROCHLORIDE 100 MG/1
TABLET, FILM COATED ORAL
Qty: 30 TABLET | Refills: 0 | OUTPATIENT
Start: 2018-03-27

## 2018-03-27 NOTE — TELEPHONE ENCOUNTER
Refused as duplicate - was filled 3/1/2018 dispense 90 with 3 refills.    Sofia Jorge RN  [facility]

## 2018-09-12 ENCOUNTER — TELEPHONE (OUTPATIENT)
Dept: NEUROLOGY | Facility: CLINIC | Age: 18
End: 2018-09-12

## 2018-09-12 DIAGNOSIS — G40.309 NONINTRACTABLE GENERALIZED IDIOPATHIC EPILEPSY WITHOUT STATUS EPILEPTICUS (H): ICD-10-CM

## 2018-09-12 RX ORDER — LEVETIRACETAM 750 MG/1
750 TABLET ORAL 2 TIMES DAILY
Qty: 62 TABLET | Refills: 11 | Status: SHIPPED | OUTPATIENT
Start: 2018-09-12 | End: 2019-01-31

## 2019-01-21 ENCOUNTER — TELEPHONE (OUTPATIENT)
Dept: NEUROLOGY | Facility: CLINIC | Age: 19
End: 2019-01-21

## 2019-01-21 DIAGNOSIS — G40.309 NONINTRACTABLE GENERALIZED IDIOPATHIC EPILEPSY WITHOUT STATUS EPILEPTICUS (H): ICD-10-CM

## 2019-01-21 NOTE — TELEPHONE ENCOUNTER
Health Call Center    Phone Message    May a detailed message be left on voicemail: yes    Reason for Call: Medication Question or concern regarding medication   Prescription Clarification  Name of Medication: levETIRAcetam (KEPPRA) 750 MG tablet  Prescribing Provider: Dr. Muñoz   Pharmacy: [facility]   What on the order needs clarification? [facility] pharmacy called in and said this Rx is on nationwide back order. The pharmacist said she has some 500mg extended tabs in stock, but otherwise nobody nearby has any. Please let them know if you want to change the Rx. [phone].      Action Taken: Message routed to:  [facility] ([facility] Neurology

## 2019-01-31 RX ORDER — LEVETIRACETAM 1000 MG/1
1500 TABLET ORAL 2 TIMES DAILY
Qty: 93 TABLET | Refills: 11 | Status: SHIPPED | OUTPATIENT
Start: 2019-01-31 | End: 2019-06-28

## 2019-01-31 NOTE — TELEPHONE ENCOUNTER
HIs levels are always low. He should be taking 1500 mg bid--he can cut the 1000 mg LVTM. Please do that. skyla (Routing comment)         You   Ciro Muñoz MD 1 hour ago (3:09 PM)      Dr. Muñoz,     Please clarify what dose you would like the patient to take. It looks like the 500 and 750 tablets are out of stock. Would 1000 BID work as a compromise between what you want the patient on and what is most recently prescribed? I see instructions from you to have him take 1250 BID but then there are orders for 750 BID.     Please advise.   Thanks, Alberto TRUJILLO RN MINCEP (Routing comment)

## 2019-02-01 NOTE — TELEPHONE ENCOUNTER
This nurse spoke to the patient by telephone and advised of the strength and instructions for his prescription. He verbalized understanding and has no questions.

## 2019-03-19 DIAGNOSIS — F41.9 ANXIETY: ICD-10-CM

## 2019-03-20 RX ORDER — SERTRALINE HYDROCHLORIDE 100 MG/1
100 TABLET, FILM COATED ORAL DAILY
Qty: 90 TABLET | Refills: 0 | Status: SHIPPED | OUTPATIENT
Start: 2019-03-20 | End: 2019-08-15

## 2019-03-20 NOTE — TELEPHONE ENCOUNTER
sertraline (ZOLOFT) 100 MG tablet  Last Written Prescription Date:  3/1/18  Last Fill Quantity: 90,   # refills: 3  Last Office Visit :3/1/18  Future Office visit: none    Scheduling has been notified to contact the pt for appointment.    90 day to pharmacy

## 2019-06-25 DIAGNOSIS — G40.309 NONINTRACTABLE GENERALIZED IDIOPATHIC EPILEPSY WITHOUT STATUS EPILEPTICUS (H): ICD-10-CM

## 2019-06-25 NOTE — TELEPHONE ENCOUNTER
Last Written Prescription Date:1/31/19  Last Fill Quantity: 93 tabs   Last office visit: 3/1/18  Future Office Visit:  f/u 3 months

## 2019-06-28 RX ORDER — LEVETIRACETAM 1000 MG/1
1500 TABLET ORAL 2 TIMES DAILY
Qty: 93 TABLET | Refills: 11 | Status: SHIPPED | OUTPATIENT
Start: 2019-06-28 | End: 2019-08-15

## 2019-07-24 ENCOUNTER — TELEPHONE (OUTPATIENT)
Dept: NEUROLOGY | Facility: CLINIC | Age: 19
End: 2019-07-24

## 2019-07-24 NOTE — TELEPHONE ENCOUNTER
----- Message from Tabatha Aldana sent at 7/23/2019  6:45 PM CDT -----  Regarding: RE: follow up appt with Dr. crum  Left pt 2 messages  ----- Message -----  From: Anne Mayer RN  Sent: 7/8/2019  11:17 AM  To: Clinic Spzpyanntxic-[id number]  Subject: follow up appt with Dr. crum                     Patient needs an ANNUAL follow up with Dr Cespedes. He has 5 cancel/no shows! Medication appt.    Thank you.    Anne CRAWFORD RN

## 2019-08-15 ENCOUNTER — OFFICE VISIT (OUTPATIENT)
Dept: NEUROLOGY | Facility: CLINIC | Age: 19
End: 2019-08-15
Payer: COMMERCIAL

## 2019-08-15 ENCOUNTER — APPOINTMENT (OUTPATIENT)
Dept: LAB | Facility: CLINIC | Age: 19
End: 2019-08-15
Payer: COMMERCIAL

## 2019-08-15 VITALS
SYSTOLIC BLOOD PRESSURE: 125 MMHG | HEIGHT: 69 IN | DIASTOLIC BLOOD PRESSURE: 83 MMHG | TEMPERATURE: 98.1 F | WEIGHT: 210.2 LBS | BODY MASS INDEX: 31.13 KG/M2 | HEART RATE: 110 BPM | OXYGEN SATURATION: 94 %

## 2019-08-15 DIAGNOSIS — G40.909 RECURRENT SEIZURES (H): Primary | ICD-10-CM

## 2019-08-15 DIAGNOSIS — F41.9 ANXIETY: ICD-10-CM

## 2019-08-15 DIAGNOSIS — G40.309 NONINTRACTABLE GENERALIZED IDIOPATHIC EPILEPSY WITHOUT STATUS EPILEPTICUS (H): ICD-10-CM

## 2019-08-15 RX ORDER — SERTRALINE HYDROCHLORIDE 100 MG/1
100 TABLET, FILM COATED ORAL DAILY
Qty: 90 TABLET | Refills: 11 | Status: SHIPPED | OUTPATIENT
Start: 2019-08-15 | End: 2020-08-18

## 2019-08-15 RX ORDER — LEVETIRACETAM 1000 MG/1
TABLET ORAL
Qty: 120 TABLET | Refills: 11 | Status: SHIPPED | OUTPATIENT
Start: 2019-08-15 | End: 2020-09-08

## 2019-08-15 SDOH — HEALTH STABILITY: MENTAL HEALTH: HOW OFTEN DO YOU HAVE A DRINK CONTAINING ALCOHOL?: MONTHLY OR LESS

## 2019-08-15 ASSESSMENT — MIFFLIN-ST. JEOR: SCORE: 1950.82

## 2019-08-15 ASSESSMENT — PAIN SCALES - GENERAL: PAINLEVEL: SEVERE PAIN (6)
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
## 2019-08-15 NOTE — NURSING NOTE
Chief Complaint   Patient presents with     RECHECK     [facility] RETURN - FOLLOW UP, MED CHECK       Marbin Blandon, EMT

## 2019-08-16 LAB — LEVETIRACETAM SERPL-MCNC: 27 UG/ML (ref 12–46)
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
## 2019-10-02 NOTE — TELEPHONE ENCOUNTER
RECORDS RECEIVED FROM: Self   DATE RECEIVED: 11/8/19   NOTES (FOR ALL VISITS) STATUS DETAILS   OFFICE NOTE from referring provider N/A    OFFICE NOTE from other specialist Internal Dr Muñoz @ [facility] Neuro:  8/15/19  3/1/18  11/28/17   DISCHARGE SUMMARY from hospital N/A    DISCHARGE REPORT from the ER N/A    OPERATIVE REPORT N/A    MEDICATION LIST Internal    IMAGING  (FOR ALL VISITS)     EMG N/A    EEG N/A    ECT N/A    MRI (HEAD, NECK, SPINE) N/A    LUMBAR PUNCTURE N/A    AZAR Scan N/A    CT (HEAD, NECK, SPINE) In process [facility]:  CT Head 11/21/16  CT Cervical Spine 11/21/16      Action    Action Taken Imaging request faxed to [facility]

## 2019-11-08 ENCOUNTER — PRE VISIT (OUTPATIENT)
Dept: NEUROLOGY | Facility: CLINIC | Age: 19
End: 2019-11-08

## 2020-02-06 ENCOUNTER — OFFICE VISIT (OUTPATIENT)
Dept: INTERNAL MEDICINE | Facility: CLINIC | Age: 20
End: 2020-02-06
Payer: COMMERCIAL

## 2020-02-06 ENCOUNTER — ANCILLARY PROCEDURE (OUTPATIENT)
Dept: GENERAL RADIOLOGY | Facility: CLINIC | Age: 20
End: 2020-02-06
Attending: INTERNAL MEDICINE
Payer: COMMERCIAL

## 2020-02-06 VITALS
DIASTOLIC BLOOD PRESSURE: 80 MMHG | TEMPERATURE: 98.1 F | BODY MASS INDEX: 31.11 KG/M2 | WEIGHT: 211.4 LBS | SYSTOLIC BLOOD PRESSURE: 116 MMHG | OXYGEN SATURATION: 97 % | HEART RATE: 58 BPM

## 2020-02-06 DIAGNOSIS — M54.50 CHRONIC BILATERAL LOW BACK PAIN WITHOUT SCIATICA: Primary | ICD-10-CM

## 2020-02-06 DIAGNOSIS — M54.50 CHRONIC BILATERAL LOW BACK PAIN WITHOUT SCIATICA: ICD-10-CM

## 2020-02-06 DIAGNOSIS — G89.29 CHRONIC BILATERAL LOW BACK PAIN WITHOUT SCIATICA: Primary | ICD-10-CM

## 2020-02-06 DIAGNOSIS — R56.9 SEIZURE (H): ICD-10-CM

## 2020-02-06 DIAGNOSIS — F33.0 MAJOR DEPRESSIVE DISORDER, RECURRENT EPISODE, MILD (H): ICD-10-CM

## 2020-02-06 DIAGNOSIS — Z23 NEED FOR TETANUS BOOSTER: ICD-10-CM

## 2020-02-06 DIAGNOSIS — G89.29 CHRONIC BILATERAL LOW BACK PAIN WITHOUT SCIATICA: ICD-10-CM

## 2020-02-06 ASSESSMENT — PATIENT HEALTH QUESTIONNAIRE - PHQ9
SUM OF ALL RESPONSES TO PHQ QUESTIONS 1-9: 8
5. POOR APPETITE OR OVEREATING: MORE THAN HALF THE DAYS

## 2020-02-06 ASSESSMENT — ANXIETY QUESTIONNAIRES
6. BECOMING EASILY ANNOYED OR IRRITABLE: MORE THAN HALF THE DAYS
GAD7 TOTAL SCORE: 6
7. FEELING AFRAID AS IF SOMETHING AWFUL MIGHT HAPPEN: NOT AT ALL
5. BEING SO RESTLESS THAT IT IS HARD TO SIT STILL: NOT AT ALL
2. NOT BEING ABLE TO STOP OR CONTROL WORRYING: NOT AT ALL
IF YOU CHECKED OFF ANY PROBLEMS ON THIS QUESTIONNAIRE, HOW DIFFICULT HAVE THESE PROBLEMS MADE IT FOR YOU TO DO YOUR WORK, TAKE CARE OF THINGS AT HOME, OR GET ALONG WITH OTHER PEOPLE: VERY DIFFICULT
1. FEELING NERVOUS, ANXIOUS, OR ON EDGE: MORE THAN HALF THE DAYS
3. WORRYING TOO MUCH ABOUT DIFFERENT THINGS: NOT AT ALL

## 2020-02-06 ASSESSMENT — PAIN SCALES - GENERAL: PAINLEVEL: SEVERE PAIN (6)

## 2020-02-06 NOTE — NURSING NOTE
19 year old  Chief Complaint   Patient presents with     Sleep Problem     Pt is here to discuss sleeping disorders.      Pain     Pt is having low back pain.        Blood pressure 116/80, pulse 58, temperature 98.1  F (36.7  C), temperature source Oral, weight 95.9 kg (211 lb 6.4 oz), SpO2 97 %. Body mass index is 31.11 kg/m .  BP completed using cuff size:      CASSIE Ferguson  February 6, 2020 10:50 AM

## 2020-02-06 NOTE — PATIENT INSTRUCTIONS
Today's Plan:    1) For your back pain, we think it is likely a muscular strain. We will do an xray today too. We also recommend you start going to physical therapy. You can also use a heat pack, which you can get at the pharmacy or the store, and take tylenol or ibuprofen sparingly if needed. Getting your back pain better controlled should also help your sleep.     2) Flu shot today    3) Please call neurology to schedule your vEEG and see Dr. Muñoz on March 10th      [facility] Phone Number [phone]  [facility] Medication Refill Request Information:  * Please contact your pharmacy regarding ANY request for medication refills.  ** [facility] Prescription Fax = [phone]  * Please allow 3 business days for routine medication refills.  * Please allow 5 business days for controlled substance medication refills.     [facility] Test Result notification information:  *You will be notified with in 7-10 days of your appointment day regarding the results of your test.  If you are on MyChart you will be notified as soon as the provider has reviewed the results and signed off on them.    Neurology [phone] [place] [place]
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
## 2020-02-07 ASSESSMENT — ANXIETY QUESTIONNAIRES: GAD7 TOTAL SCORE: 6

## 2020-03-22 DIAGNOSIS — G40.309 NONINTRACTABLE GENERALIZED IDIOPATHIC EPILEPSY WITHOUT STATUS EPILEPTICUS (H): ICD-10-CM

## 2020-03-24 RX ORDER — LEVETIRACETAM 750 MG/1
TABLET ORAL
Qty: 62 TABLET | Refills: 11 | OUTPATIENT
Start: 2020-03-24

## 2020-03-24 NOTE — TELEPHONE ENCOUNTER
Pending Prescriptions:                       Disp   Refills    levETIRAcetam (KEPPRA) 750 MG tablet [Pha*62 tab*11           Sig: TAKE 1 TABLET BY MOUTH TWICE DAILY    Last office visit: 8/15/2019 with Dr. Muñoz.     In summary, his seizures cannot be classified correctly without video EEG monitoring.  He has had other episodes which he calls panic attacks, which may remotely represent seizures.  There are psychiatric issues.  He has major depressive disorder.  We will admit him for video EEG monitoring of his episodes with the required prior MINCEP evaluation and we will need to have his MRI repeated, his seizures classified and needs psychiatric evaluation while hospitalized.      We will see him after the studies are completed.  In the meantime, we will raise his levetiracetam dose to 2000 b.i.d. and renew his Zoloft and check levels today.      Ciro Muñoz MD     Last prescription:   Order History   Outpatient   Date/Time  Action Taken  User  Additional Information    08/15/19 165  Sign  Ciro Muñoz MD  Reorder from Order: [id number]    08/15/19 1652  Taking Flag Checked  Ciro Muñoz MD     20 1106  Taking Flag Checked  Jamila Swan, ADELINA     Outpatient Medication Detail      Disp  Refills  Start  End  JOEL    levETIRAcetam (KEPPRA) 1000 MG tablet  120 tablet  11  8/15/2019   No    Si TABS TWICE A DAY    Sent to pharmacy as: levETIRAcetam (KEPPRA) 1000 MG tablet    Class: E-Prescribe    Order: [id number]    E-Prescribing Status: Receipt confirmed by pharmacy (8/15/2019  4:52 PM CDT)    Printout Tracking     External Result Report    Medication Administration Instructions     2 TABS TWICE A DAY    Pharmacy     [id number] - [place], [place] - [address] AT [facility] OF [address]      Next office visit:   No show 3/10/2020  No return appointment scheduled at this time.       Action: Refill refused because the prescription send on 8/15/2019 should still be  good for another 5 months.

## 2020-08-18 DIAGNOSIS — F41.9 ANXIETY: ICD-10-CM

## 2020-08-21 RX ORDER — SERTRALINE HYDROCHLORIDE 100 MG/1
TABLET, FILM COATED ORAL
Qty: 90 TABLET | Refills: 3 | Status: SHIPPED | OUTPATIENT
Start: 2020-08-21 | End: 2021-07-29

## 2020-09-06 DIAGNOSIS — G40.309 NONINTRACTABLE GENERALIZED IDIOPATHIC EPILEPSY WITHOUT STATUS EPILEPTICUS (H): ICD-10-CM

## 2020-09-08 RX ORDER — LEVETIRACETAM 1000 MG/1
TABLET ORAL
Qty: 120 TABLET | Refills: 11 | OUTPATIENT
Start: 2020-09-08

## 2020-09-08 RX ORDER — LEVETIRACETAM 1000 MG/1
TABLET ORAL
Qty: 120 TABLET | Refills: 0 | Status: SHIPPED | OUTPATIENT
Start: 2020-09-08 | End: 2020-10-07

## 2020-09-08 NOTE — TELEPHONE ENCOUNTER
levETIRAcetam (KEPPRA) 1000 MG tablet  Last Written Prescription Date: 8/15/19  Last Fill Quantity: 120,   # refills: 11  Last Office Visit : 8/15/19  Future Office visit:  None     Return in about 3 months (around 11/15/2019).   Scheduling has been notified to contact the pt for appointment.

## 2020-10-03 DIAGNOSIS — G40.309 NONINTRACTABLE GENERALIZED IDIOPATHIC EPILEPSY WITHOUT STATUS EPILEPTICUS (H): ICD-10-CM

## 2020-10-07 NOTE — TELEPHONE ENCOUNTER
LEVETIRACETAM 1000MG TABLETS    Last Written Prescription Date:  9/8/2020  Last Fill Quantity: 120,   # refills: 0  Last Office Visit : 8/15/2019  Future Office visit:  None  Routing refill request to provider for review/approval because:  Office Visit due      Second Request     Refer to Provider for review         Nisa Cartwright RN  Central Triage Red Flags/Med Refills

## 2020-10-08 RX ORDER — LEVETIRACETAM 1000 MG/1
TABLET ORAL
Qty: 120 TABLET | Refills: 11 | Status: SHIPPED | OUTPATIENT
Start: 2020-10-08 | End: 2021-08-05

## 2021-01-05 DIAGNOSIS — G40.309 NONINTRACTABLE GENERALIZED IDIOPATHIC EPILEPSY WITHOUT STATUS EPILEPTICUS (H): ICD-10-CM

## 2021-01-07 RX ORDER — LEVETIRACETAM 1000 MG/1
TABLET ORAL
Qty: 93 TABLET | OUTPATIENT
Start: 2021-01-07

## 2021-07-27 DIAGNOSIS — F41.9 ANXIETY: ICD-10-CM

## 2021-07-29 NOTE — TELEPHONE ENCOUNTER
SERTRALINE 100MG TABLETS      Last Written Prescription Date:  8/21/21  Last Fill Quantity: 90,   # refills: 3  Last Office Visit : 8/15/19  Future Office visit:  none    Routing refill request to provider for review/approval because:  Overdue for appt by >14mo  Message sent to [email or web address] desk pool for scheduing  Thank-you, Katie LUNA RN Medication Refill Team

## 2021-08-03 DIAGNOSIS — G40.309 NONINTRACTABLE GENERALIZED IDIOPATHIC EPILEPSY WITHOUT STATUS EPILEPTICUS (H): ICD-10-CM

## 2021-08-03 NOTE — TELEPHONE ENCOUNTER
Patient scheduled first available with Dr. Muñoz on 9/28/21. Would like a bridge in medication to get through to appointment.Patient will be out of medication by Friday

## 2021-08-04 RX ORDER — LEVETIRACETAM 1000 MG/1
TABLET ORAL
Qty: 120 TABLET | Refills: 11 | OUTPATIENT
Start: 2021-08-04

## 2021-08-04 RX ORDER — SERTRALINE HYDROCHLORIDE 100 MG/1
100 TABLET, FILM COATED ORAL DAILY
Qty: 30 TABLET | Refills: 1 | Status: SHIPPED | OUTPATIENT
Start: 2021-08-04 | End: 2021-10-04

## 2021-08-05 RX ORDER — LEVETIRACETAM 1000 MG/1
TABLET ORAL
Qty: 124 TABLET | Refills: 1 | Status: SHIPPED | OUTPATIENT
Start: 2021-08-05 | End: 2021-09-28

## 2021-09-28 ENCOUNTER — VIRTUAL VISIT (OUTPATIENT)
Dept: NEUROLOGY | Facility: CLINIC | Age: 21
End: 2021-09-28
Payer: COMMERCIAL

## 2021-09-28 DIAGNOSIS — G40.309 NONINTRACTABLE GENERALIZED IDIOPATHIC EPILEPSY WITHOUT STATUS EPILEPTICUS (H): ICD-10-CM

## 2021-09-28 PROCEDURE — 99213 OFFICE O/P EST LOW 20 MIN: CPT | Mod: 95 | Performed by: PSYCHIATRY & NEUROLOGY

## 2021-09-28 RX ORDER — LEVETIRACETAM 1000 MG/1
TABLET ORAL
Qty: 124 TABLET | Refills: 11 | Status: SHIPPED | OUTPATIENT
Start: 2021-09-28 | End: 2022-11-01

## 2021-09-28 NOTE — LETTER
9/28/2021       RE: Martín Alvarez  [address] [facility] [address]     Dear Colleague,    Thank you for referring your patient, Martín Alvarez, to the [facility] NEUROLOGY CLINIC [facility]. Please see a copy of my visit note below.    Martín is a 21 year old who is being evaluated via a billable video visit.      How would you like to obtain your AVS? MyChart  If the video visit is dropped, the invitation should be resent by: Text to cell phone: [phone]  Will anyone else be joining your video visit?   Video-Visit Details    Type of service:  Video Visit x 24 min        Platform used for Video Visit: Stacey        Again, thank you for allowing me to participate in the care of your patient.      Sincerely,    Ciro Muñoz MD

## 2021-09-28 NOTE — PROGRESS NOTES
Service Date: 09/28/2021    HISTORY OF PRESENT ILLNESS:  This patient, Martín Alvarez, is a 21-year-old  seen in the [facility] regarding his history of seizures from a traumatic brain injury.  I have not seen Martín, which we were able to converse in Pashto, since 2019.  His history is that he has been consistently followed and has a history of seizure, and his EEG has shown frontal spike.  MRI before has been normal.  He has had psychiatric issues with panic attack and depression, and major depression, and it has been difficult for him to get to therapy.  He says today his depression is not bad.  He has not seen a therapist, but he is taking the Zoloft and he denies any suicide intent.  He has had 2 episodes that sound like seizures, and this was nocturnal biting of his tongue and loss of consciousness.  Since this visit in 2019, he said it had been a year since he has had seizures.  He is using a pillbox.  We have seen him physically, and there has not been any major neurological issues.  We did recommend a video monitoring, but because of financial reasons he has not been able to do it.  He has had panic attack.  He has had psychiatric issues and major depressive disorder.    We have not had blood levels of his medications, which consist of levetiracetam 2000 b.i.d., although he says he is taking only 1000 b.i.d.  His last level is not recorded here, and the previous one in 2019 was 27.  He says he has no other major medical problems.    PHYSICAL EXAMINATION:  On exam, he looks pretty good.  His affect seems pretty normal.  His blood pressure is 116/80.    ASSESSMENT AND PLAN:  In summary, he is seizure-free since 3 years.  He is not driving.  We need to check his concentration and that was ordered for him to have a Keppra level drawn.  We okayed his Keppra medication.  We will follow him in a year.  He is conscious about caring for his depression.  He seems to be okay there.    Ciro Muñoz,  MD        D: 2021   T: 2021   MT: theresa    Name:     CUATE GRACIA  MRN:      [id number]        Account:      [id number]   :      2000           Service Date: 2021       Document: [id number]

## 2021-09-28 NOTE — PROGRESS NOTES
Martín is a 21 year old who is being evaluated via a billable video visit.      How would you like to obtain your AVS? MyChart  If the video visit is dropped, the invitation should be resent by: Text to cell phone: [phone]  Will anyone else be joining your video visit?   Video-Visit Details    Type of service:  Video Visit x 24 min        Platform used for Video Visit: [employer]

## 2021-09-28 NOTE — LETTER
Date:December 1, 2021      Patient was self referred, no letter generated. Do not send.        [facility] Health Information

## 2021-10-03 DIAGNOSIS — F41.9 ANXIETY: ICD-10-CM

## 2021-10-04 RX ORDER — SERTRALINE HYDROCHLORIDE 100 MG/1
100 TABLET, FILM COATED ORAL DAILY
Qty: 30 TABLET | Refills: 10 | Status: SHIPPED | OUTPATIENT
Start: 2021-10-04 | End: 2022-11-01

## 2021-10-04 NOTE — TELEPHONE ENCOUNTER
sertraline (ZOLOFT) 100 MG tablet    Take 1 tablet (100 mg) by mouth daily     Last Written Prescription Date:  8/4/21  Last Fill Quantity: 30,   # refills: 1  Last Office Visit : 9/28/21  Future Office visit:  none    RF to pharmacy.

## 2022-11-01 ENCOUNTER — OFFICE VISIT (OUTPATIENT)
Dept: FAMILY MEDICINE | Facility: CLINIC | Age: 22
End: 2022-11-01
Payer: COMMERCIAL

## 2022-11-01 VITALS
WEIGHT: 178 LBS | HEART RATE: 59 BPM | HEIGHT: 71 IN | BODY MASS INDEX: 24.92 KG/M2 | SYSTOLIC BLOOD PRESSURE: 109 MMHG | OXYGEN SATURATION: 98 % | RESPIRATION RATE: 18 BRPM | DIASTOLIC BLOOD PRESSURE: 74 MMHG

## 2022-11-01 DIAGNOSIS — Z11.4 SCREENING FOR HIV (HUMAN IMMUNODEFICIENCY VIRUS): ICD-10-CM

## 2022-11-01 DIAGNOSIS — Z11.59 NEED FOR HEPATITIS C SCREENING TEST: ICD-10-CM

## 2022-11-01 DIAGNOSIS — F33.0 MAJOR DEPRESSIVE DISORDER, RECURRENT EPISODE, MILD (H): ICD-10-CM

## 2022-11-01 DIAGNOSIS — Z23 NEED FOR PROPHYLACTIC VACCINATION AND INOCULATION AGAINST INFLUENZA: ICD-10-CM

## 2022-11-01 DIAGNOSIS — F41.9 ANXIETY: ICD-10-CM

## 2022-11-01 DIAGNOSIS — G40.309 NONINTRACTABLE GENERALIZED IDIOPATHIC EPILEPSY WITHOUT STATUS EPILEPTICUS (H): Primary | ICD-10-CM

## 2022-11-01 LAB
HCV AB SERPL QL IA: NONREACTIVE
HIV 1+2 AB+HIV1 P24 AG SERPL QL IA: NONREACTIVE
LEVETIRACETAM SERPL-MCNC: 30.3 ΜG/ML (ref 10–40)

## 2022-11-01 PROCEDURE — 99204 OFFICE O/P NEW MOD 45 MIN: CPT | Mod: 25 | Performed by: STUDENT IN AN ORGANIZED HEALTH CARE EDUCATION/TRAINING PROGRAM

## 2022-11-01 PROCEDURE — 86803 HEPATITIS C AB TEST: CPT | Performed by: STUDENT IN AN ORGANIZED HEALTH CARE EDUCATION/TRAINING PROGRAM

## 2022-11-01 PROCEDURE — 90471 IMMUNIZATION ADMIN: CPT | Performed by: STUDENT IN AN ORGANIZED HEALTH CARE EDUCATION/TRAINING PROGRAM

## 2022-11-01 PROCEDURE — 80177 DRUG SCRN QUAN LEVETIRACETAM: CPT | Performed by: STUDENT IN AN ORGANIZED HEALTH CARE EDUCATION/TRAINING PROGRAM

## 2022-11-01 PROCEDURE — 36415 COLL VENOUS BLD VENIPUNCTURE: CPT | Performed by: STUDENT IN AN ORGANIZED HEALTH CARE EDUCATION/TRAINING PROGRAM

## 2022-11-01 PROCEDURE — 87389 HIV-1 AG W/HIV-1&-2 AB AG IA: CPT | Performed by: STUDENT IN AN ORGANIZED HEALTH CARE EDUCATION/TRAINING PROGRAM

## 2022-11-01 PROCEDURE — 90686 IIV4 VACC NO PRSV 0.5 ML IM: CPT | Performed by: STUDENT IN AN ORGANIZED HEALTH CARE EDUCATION/TRAINING PROGRAM

## 2022-11-01 RX ORDER — SERTRALINE HYDROCHLORIDE 100 MG/1
100 TABLET, FILM COATED ORAL DAILY
Qty: 30 TABLET | Refills: 10 | Status: SHIPPED | OUTPATIENT
Start: 2022-11-01 | End: 2024-05-20

## 2022-11-01 RX ORDER — ONDANSETRON 4 MG/1
4-8 TABLET, ORALLY DISINTEGRATING ORAL EVERY 8 HOURS PRN
Qty: 12 TABLET | Refills: 1 | Status: SHIPPED | OUTPATIENT
Start: 2022-11-01 | End: 2024-05-20

## 2022-11-01 RX ORDER — LEVETIRACETAM 1000 MG/1
TABLET ORAL
Qty: 124 TABLET | Refills: 11 | Status: SHIPPED | OUTPATIENT
Start: 2022-11-01 | End: 2023-12-13

## 2022-11-01 NOTE — PROGRESS NOTES
Preceptor Attestation:   Patient seen, evaluated and discussed with the resident. I have verified the content of the note, which accurately reflects my assessment of the patient and the plan of care.  Supervising Physician:Meri Ahn MD  [facility]

## 2022-11-01 NOTE — PROGRESS NOTES
CHIEF COMPLAINT                                                      Chief Complaint   Patient presents with     Medication Refill     Refill per pt       ASSESSMENT/PLAN:     (G40.309) Nonintractable generalized idiopathic epilepsy without status epilepticus (H)  (primary encounter diagnosis)  Comment: Historically well controlled on keppra 1000mg BID, though occasionally has difficulty remembering to take medicine - per mom, if he forgets one dose he will likely have a seizure later that day. Last episode about a year ago. Now has a pill box and phone reminders to help remember meds. Neurologist whom he used to follow with recently retired; is here today to establish care and get med refill until he can establish with a new neurologist.  Plan:   -Adult Neurology  Referral to re-establish  -levETIRAcetam (KEPPRA) 1000 MG tablet refill  -Keppra (Levetiracetam) Level  -Provided handout on seizure precautions and anticipatory guidance for home    (F33.0) Major depressive disorder, recurrent episode, mild (H)  (F41.9) Anxiety  Comment: Historically well controlled on zoloft 150mg (100mg + 50mg tablets). 50mg tabs recently fell off list so has just been on 100mg daily. Wanting to go back up, reporting improved mood and nerves on higher dose. Previously saw talk therapist and found it helpful. Wants to re-establish.  Plan:   -Increase zoloft from 100 to 150mg daily  -Mental health referral to re-establish with talk therapy    (Z23) Need for prophylactic vaccination and inoculation against influenza  Plan: INFLUENZA VACCINE IM > 6 MONTHS VALENT IIV4         (AFLURIA/FLUZONE)    (Z11.4) Screening for HIV (human immunodeficiency virus)  Plan: HIV Screening    (Z11.59) Need for hepatitis C screening test  Plan:   -Hep C w/ reflex to RNA quat            Options for treatment and follow-up care were reviewed with the patient and/or guardian. Martín Alvarez and/or guardian engaged in the decision making process  "and verbalized understanding of the options discussed and agreed with the final plan    Precepted with Meri Ahn MD.    Torrey Persaud MD - PGY3  [facility] - [place], [place] Residency      SUBJECTIVE:                                                    Martín Alvarez is a 22 year old year old male who presents to clinic today for the following health issues:    Establish care/med refill  Follows with neuro for h/o seizure - on keppra 1000mg BID. Last saw them one year ago - due. Neurologist just retired and they need a new one. Needs keppra refilled. Has another week's worth. Had seizure around the start of 2022. Don't have emergency abortive meds for home  If he misses a dose \"he'll have a seizure that night\"  Sometimes has trouble remembering - now has pill box which has helped  Lives close to clinic  Here with mom - his caregiver  Currently not working - looking for a job  Gets anxiety \"every once in a while\"  Previously was on 150mg - two separate pills. For the past few months has only been getting 100mg dose. Wants to go back up    ----------------------------------------------------------------------------------------------------------------------  Patient Active Problem List   Diagnosis     Headache     Allergic rhinitis due to other allergen     Anxiety     Myopia     Intermittent asthma, exercise induced.      Academic underachievement     Amblyopia     Esotropia     Adjustment disorder with anxiety     Outbursts of anger     Seizure (H)     Visual disturbance     Seasonal allergic rhinitis     Chronic allergic conjunctivitis     Homeless     Acne vulgaris     Major depressive disorder, recurrent episode, mild (H)     Anisometropia     Hyperopia with astigmatism     Past Surgical History:   Procedure Laterality Date     TONSILLECTOMY & ADENOIDECTOMY      age 2       Social History     Tobacco Use     Smoking status: Never     Smokeless tobacco: Never   Substance Use Topics     Alcohol use: Yes     " "Family History   Problem Relation Age of Onset     Brain Cancer Other         brain tumor     Arthritis Mother      Diabetes Maternal Grandmother          Problem list and past medical, surgical, social, and family histories reviewed & adjusted, as indicated.    Current Outpatient Medications   Medication Sig Dispense Refill     levETIRAcetam (KEPPRA) 1000 MG tablet TAKE 1 TABLETS BY MOUTH TWICE DAILY 124 tablet 11     ondansetron (ZOFRAN ODT) 4 MG ODT tab Take 1-2 tablets (4-8 mg) by mouth every 8 hours as needed for nausea (Patient not taking: Reported on 2/6/2020) 20 tablet 1     sertraline (ZOLOFT) 100 MG tablet Take 1 tablet (100 mg) by mouth daily 30 tablet 10     sertraline (ZOLOFT) 50 MG tablet Take 1 tablet (50 mg) by mouth daily In addition to 100 mg tablets for a total of 150 per day 90 tablet 3     Medication list reviewed and updated as indicated.    Allergies   Allergen Reactions     Seasonal Allergies      Itchy, red eyes       Allergies reviewed and updated as indicated.  ----------------------------------------------------------------------------------------------------------------------  ROS:  Constitutional, HEENT, cardiovascular, pulmonary, GI, musculoskeletal, neuro, skin, and psych systems are negative, except as otherwise noted.    OBJECTIVE:     /74   Pulse 59   Resp 18   Ht 1.803 m (5' 11\")   Wt 80.7 kg (178 lb)   SpO2 98%   BMI 24.83 kg/m    Body mass index is 24.83 kg/m .  Exam:  Constitutional: healthy, alert and no distress  Head: Normocephalic. Atraumatic  Neck: Neck supple. FROM  Cardiovascular: Acyanotic  Respiratory: Normal chest rise. Non-labored breathing.  Musculoskeletal: extremities normal- no gross deformities noted, gait normal and normal muscle tone  Skin: no suspicious lesions or rashes  Neurologic: Gait normal. CN II-XII grossly intact  Psychiatric: mentation appears normal and affect normal/bright      "

## 2022-11-10 ENCOUNTER — TELEPHONE (OUTPATIENT)
Dept: FAMILY MEDICINE | Facility: CLINIC | Age: 22
End: 2022-11-10

## 2022-11-10 NOTE — TELEPHONE ENCOUNTER
Called pt, lm voicemail to  clinic for Dr Persaud's lab results  Team - please call patient with results. Negative hep c and HIV screening as expected. Keppra level in goal range. Continue your current plan of care and call with any questions.   Dr. Persaud

## 2022-11-18 ENCOUNTER — TELEPHONE (OUTPATIENT)
Dept: FAMILY MEDICINE | Facility: CLINIC | Age: 22
End: 2022-11-18

## 2022-11-18 NOTE — TELEPHONE ENCOUNTER
Called pt to cb for Dr. Persaud lab results    please call patient with results. Negative hep c and HIV screening as expected. Keppra level in goal range. Continue your current plan of care and call with any questions.  Dr. Persaud

## 2023-04-17 ENCOUNTER — ANCILLARY ORDERS (OUTPATIENT)
Dept: NEUROLOGY | Facility: CLINIC | Age: 23
End: 2023-04-17

## 2023-04-17 DIAGNOSIS — R56.9 SEIZURE (H): ICD-10-CM

## 2023-12-13 DIAGNOSIS — G40.309 NONINTRACTABLE GENERALIZED IDIOPATHIC EPILEPSY WITHOUT STATUS EPILEPTICUS (H): ICD-10-CM

## 2023-12-13 NOTE — TELEPHONE ENCOUNTER
Message to physician:     Date of last visit: 11/1/2022    Date of next visit if scheduled:     Potassium   Date Value Ref Range Status   11/28/2017 3.9 3.4 - 5.3 mmol/L Final     Creatinine   Date Value Ref Range Status   11/28/2017 0.82 0.50 - 1.00 mg/dL Final     GFR Estimate   Date Value Ref Range Status   11/28/2017 >90 >60 mL/min/1.7m2 Final     Comment:     Non  GFR Calc       BP Readings from Last 3 Encounters:   11/01/22 109/74   02/06/20 116/80   08/15/19 125/83       Hemoglobin A1C   Date Value Ref Range Status   06/19/2017 5.0 4.3 - 6.0 % Final       Please complete refill and CLOSE ENCOUNTER.  Closing the encounter signifies the refill is complete.

## 2023-12-13 NOTE — TELEPHONE ENCOUNTER
[facility] phone call message- medication clarification/question:    Full Medication Name: levETIRAcetam (KEPPRA) 1000 MG tablet        Question: mother called in for refill on the above medication -- please send to below pharmacy as soon as possible --he is almost out of the medication -- thank you    Pharmacy confirmed as    [id number] - [place], [place] - [address] AT [facility] [address]: Yes    OK to leave a message on voice mail? Yes    Primary language: English      needed? No    Call taken on December 13, 2023 at 2:47 PM by Sunitha Morejon

## 2023-12-15 RX ORDER — LEVETIRACETAM 1000 MG/1
TABLET ORAL
Qty: 120 TABLET | Refills: 1 | Status: SHIPPED | OUTPATIENT
Start: 2023-12-15 | End: 2024-04-10

## 2024-01-03 NOTE — NURSING NOTE
"Chief Complaint   Patient presents with     Anxiety     also had seizure on Sat.       Initial /71 (BP Location: Left arm, Patient Position: Chair, Cuff Size: Adult Large)  Pulse 80  Temp 97.6  F (36.4  C) (Oral)  Wt 196 lb (88.9 kg)  SpO2 98%  BMI 28.93 kg/m2 Estimated body mass index is 28.93 kg/(m^2) as calculated from the following:    Height as of 11/28/17: 5' 9.02\" (1.753 m).    Weight as of this encounter: 196 lb (88.9 kg).  Medication Reconciliation: complete   Yohana Castillo CMA      " Left arm;

## 2024-04-10 DIAGNOSIS — G40.309 NONINTRACTABLE GENERALIZED IDIOPATHIC EPILEPSY WITHOUT STATUS EPILEPTICUS (H): ICD-10-CM

## 2024-04-10 RX ORDER — LEVETIRACETAM 1000 MG/1
TABLET ORAL
Qty: 60 TABLET | Refills: 0 | Status: SHIPPED | OUTPATIENT
Start: 2024-04-10 | End: 2024-05-02

## 2024-04-10 NOTE — TELEPHONE ENCOUNTER
Message to physician:     Date of last visit: 11/1/2022    Date of next visit if scheduled: None    Potassium   Date Value Ref Range Status   11/28/2017 3.9 3.4 - 5.3 mmol/L Final     Creatinine   Date Value Ref Range Status   11/28/2017 0.82 0.50 - 1.00 mg/dL Final     GFR Estimate   Date Value Ref Range Status   11/28/2017 >90 >60 mL/min/1.7m2 Final     Comment:     Non  GFR Calc       BP Readings from Last 3 Encounters:   11/01/22 109/74   02/06/20 116/80   08/15/19 125/83       Hemoglobin A1C   Date Value Ref Range Status   06/19/2017 5.0 4.3 - 6.0 % Final       Please complete refill and CLOSE ENCOUNTER.  Closing the encounter signifies the refill is complete.

## 2024-05-02 DIAGNOSIS — G40.309 NONINTRACTABLE GENERALIZED IDIOPATHIC EPILEPSY WITHOUT STATUS EPILEPTICUS (H): ICD-10-CM

## 2024-05-02 NOTE — LETTER
May 13, 2024      Martín Alvarez  [address] [place] [id number]        Dear Martín,    We apologize that we have been unable to reach you by phone. Please give the clinic a call at [phone] so that we can assist you in scheduling a follow up appointment.         Sincerely,    Marianne Curtis MD

## 2024-05-05 RX ORDER — LEVETIRACETAM 1000 MG/1
TABLET ORAL
Qty: 60 TABLET | Refills: 0 | Status: SHIPPED | OUTPATIENT
Start: 2024-05-05 | End: 2024-05-20

## 2024-05-20 ENCOUNTER — OFFICE VISIT (OUTPATIENT)
Dept: FAMILY MEDICINE | Facility: CLINIC | Age: 24
End: 2024-05-20
Payer: COMMERCIAL

## 2024-05-20 VITALS
OXYGEN SATURATION: 98 % | TEMPERATURE: 97.9 F | DIASTOLIC BLOOD PRESSURE: 78 MMHG | WEIGHT: 176 LBS | HEIGHT: 70 IN | SYSTOLIC BLOOD PRESSURE: 116 MMHG | BODY MASS INDEX: 25.2 KG/M2 | HEART RATE: 50 BPM

## 2024-05-20 DIAGNOSIS — G40.309 NONINTRACTABLE GENERALIZED IDIOPATHIC EPILEPSY WITHOUT STATUS EPILEPTICUS (H): Primary | ICD-10-CM

## 2024-05-20 DIAGNOSIS — Z79.899 MEDICATION MANAGEMENT: ICD-10-CM

## 2024-05-20 LAB
ERYTHROCYTE [DISTWIDTH] IN BLOOD BY AUTOMATED COUNT: 12.2 % (ref 10–15)
HCT VFR BLD AUTO: 44.8 % (ref 40–53)
HGB BLD-MCNC: 15.6 G/DL (ref 13.3–17.7)
MCH RBC QN AUTO: 28.7 PG (ref 26.5–33)
MCHC RBC AUTO-ENTMCNC: 34.8 G/DL (ref 31.5–36.5)
MCV RBC AUTO: 82 FL (ref 78–100)
PLATELET # BLD AUTO: 199 10E3/UL (ref 150–450)
RBC # BLD AUTO: 5.44 10E6/UL (ref 4.4–5.9)
WBC # BLD AUTO: 6.2 10E3/UL (ref 4–11)

## 2024-05-20 PROCEDURE — 85027 COMPLETE CBC AUTOMATED: CPT | Performed by: FAMILY MEDICINE

## 2024-05-20 PROCEDURE — 36415 COLL VENOUS BLD VENIPUNCTURE: CPT | Performed by: FAMILY MEDICINE

## 2024-05-20 PROCEDURE — 80177 DRUG SCRN QUAN LEVETIRACETAM: CPT | Performed by: FAMILY MEDICINE

## 2024-05-20 PROCEDURE — 99214 OFFICE O/P EST MOD 30 MIN: CPT | Performed by: FAMILY MEDICINE

## 2024-05-20 PROCEDURE — 80053 COMPREHEN METABOLIC PANEL: CPT | Performed by: FAMILY MEDICINE

## 2024-05-20 RX ORDER — LEVETIRACETAM 1000 MG/1
TABLET ORAL
Qty: 90 TABLET | Refills: 3 | Status: SHIPPED | OUTPATIENT
Start: 2024-05-20

## 2024-05-20 ASSESSMENT — PATIENT HEALTH QUESTIONNAIRE - PHQ9
SUM OF ALL RESPONSES TO PHQ QUESTIONS 1-9: 5
10. IF YOU CHECKED OFF ANY PROBLEMS, HOW DIFFICULT HAVE THESE PROBLEMS MADE IT FOR YOU TO DO YOUR WORK, TAKE CARE OF THINGS AT HOME, OR GET ALONG WITH OTHER PEOPLE: NOT DIFFICULT AT ALL
SUM OF ALL RESPONSES TO PHQ QUESTIONS 1-9: 5

## 2024-05-20 ASSESSMENT — ASTHMA QUESTIONNAIRES
ACT_TOTALSCORE: 25
QUESTION_1 LAST FOUR WEEKS HOW MUCH OF THE TIME DID YOUR ASTHMA KEEP YOU FROM GETTING AS MUCH DONE AT WORK, SCHOOL OR AT HOME: NONE OF THE TIME
QUESTION_2 LAST FOUR WEEKS HOW OFTEN HAVE YOU HAD SHORTNESS OF BREATH: NOT AT ALL
QUESTION_4 LAST FOUR WEEKS HOW OFTEN HAVE YOU USED YOUR RESCUE INHALER OR NEBULIZER MEDICATION (SUCH AS ALBUTEROL): NOT AT ALL
QUESTION_3 LAST FOUR WEEKS HOW OFTEN DID YOUR ASTHMA SYMPTOMS (WHEEZING, COUGHING, SHORTNESS OF BREATH, CHEST TIGHTNESS OR PAIN) WAKE YOU UP AT NIGHT OR EARLIER THAN USUAL IN THE MORNING: NOT AT ALL
ACT_TOTALSCORE: 25
QUESTION_5 LAST FOUR WEEKS HOW WOULD YOU RATE YOUR ASTHMA CONTROL: COMPLETELY CONTROLLED

## 2024-05-20 NOTE — PATIENT INSTRUCTIONS
For your epilepsy (seizure disorder) continue Keppra 1000 mg twice a day    This medication regimen has controlled your seizures well for many years.    Your neurologist retired a couple of years ago, I have placed a referral for you to establish care with a new neurologist.    Continue to take Keppra as prescribed without interruption until you are seen in advised otherwise by a neurologist.    Today you had some routine labs for medical management including a blood count, liver test, kidney test, and Keppra level.    I will contact you if there are any results from these lab tests or require any further evaluation or treatment planning.    Continue to make healthy diet choices, increase your physical activity, and return to clinic if you develop any new symptoms or have new concerns.    As we discussed today, if you ever want to explore vocational counseling, and explore options for full-time work, feel free to return to clinic and I be happy to meet with you in more detail and see if we can get you connected with some .    Nice to meet you today!

## 2024-05-21 LAB
ALBUMIN SERPL BCG-MCNC: 5 G/DL (ref 3.5–5.2)
ALP SERPL-CCNC: 82 U/L (ref 40–150)
ALT SERPL W P-5'-P-CCNC: 12 U/L (ref 0–70)
ANION GAP SERPL CALCULATED.3IONS-SCNC: 11 MMOL/L (ref 7–15)
AST SERPL W P-5'-P-CCNC: 20 U/L (ref 0–45)
BILIRUB SERPL-MCNC: 0.8 MG/DL
BUN SERPL-MCNC: 11.1 MG/DL (ref 6–20)
CALCIUM SERPL-MCNC: 9.7 MG/DL (ref 8.6–10)
CHLORIDE SERPL-SCNC: 107 MMOL/L (ref 98–107)
CREAT SERPL-MCNC: 0.86 MG/DL (ref 0.67–1.17)
DEPRECATED HCO3 PLAS-SCNC: 26 MMOL/L (ref 22–29)
EGFRCR SERPLBLD CKD-EPI 2021: >90 ML/MIN/1.73M2
GLUCOSE SERPL-MCNC: 100 MG/DL (ref 70–99)
LEVETIRACETAM SERPL-MCNC: 20.8 ΜG/ML (ref 10–40)
POTASSIUM SERPL-SCNC: 4.5 MMOL/L (ref 3.4–5.3)
PROT SERPL-MCNC: 7.6 G/DL (ref 6.4–8.3)
SODIUM SERPL-SCNC: 144 MMOL/L (ref 135–145)

## 2024-05-28 NOTE — RESULT ENCOUNTER NOTE
Please call results and recommendations    Your kidney and liver function tests were normal.    Your blood count is normal.    Your Keppra medication level is normal.    The neurology office is reaching out to you by telephone to schedule an appointment for you to establish care for your seizure disorder with a new neurologist.    See Dr. Le as needed, and no later than May of 2025.

## 2024-06-08 NOTE — PROGRESS NOTES
HPI:   Martín Alvarez is a 24 year old  male who presents for:    He has a long history of epilepsy, which has been well-controlled on Keppra.  He has not seen a neurologist in the past couple of years, as his neurologist retired a couple of years ago.  He has continued on Keppra 1000 mg twice a day.  He has not been bothered with significant side effects.  He has not had any seizures or loss of consciousness.  He has otherwise been feeling well.    He tries to make healthy diet choices.  He does not do any specific exercise and is fairly sedentary.  He likes to play video games.  He had some disruption during high school, and did not get his diploma.    He is not doing any vocational work at this point, he might be interested in exploring vocational counseling and options for developing some skills to use outside the home in the future.         PMHX:     Patient Active Problem List   Diagnosis    Headache    Allergic rhinitis due to other allergen    Anxiety    Myopia    Intermittent asthma, exercise induced.     Academic underachievement    Amblyopia    Esotropia    Adjustment disorder with anxiety    Outbursts of anger    Seizure (H)    Visual disturbance    Seasonal allergic rhinitis    Chronic allergic conjunctivitis    Homeless    Acne vulgaris    Major depressive disorder, recurrent episode, mild (H24)    Anisometropia    Hyperopia with astigmatism       Current Outpatient Medications   Medication Sig Dispense Refill    levETIRAcetam (KEPPRA) 1000 MG tablet TAKE 1 TABLET BY MOUTH TWICE DAILY 90 tablet 3       Social History     Tobacco Use    Smoking status: Never     Passive exposure: Never    Smokeless tobacco: Never   Substance Use Topics    Alcohol use: Yes    Drug use: Yes     Types: Marijuana       Social History     Social History Narrative    Lives with mom and step dad in [place] (has been living there for >1 year in 2020)    Not currently working - 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         Review of Systems:     General: No fevers or recent illness  ENT: No headaches.  No visual changes.  He does wear glasses.  Respiratory: No cough or shortness of breath  Cardiovascular: No difficulty with walking, climbing stairs, no chest pain or shortness of breath episodes with exertion  Skin: No rash         Physical Exam:     Vitals:    05/20/24 1504   BP: 116/78   BP Location: Right arm   Patient Position: Sitting   Cuff Size: Adult Regular   Pulse: 50   Temp: 97.9  F (36.6  C)   SpO2: 98%   Weight: 79.8 kg (176 lb)   Height: 1.777 m (5' 9.96\")     Body mass index is 25.28 kg/m .    General: Alert,   in no acute distress  HEENT: Head is free of trauma.   Sclerae non-icteric. PERRL, Moist oral mucus membranes, no tonsilar hypertrophy or exudate.   Resp: Clear to auscultation bilaterally  CV: Regular rate and rhythm  Abd: Soft, non-tender.  Ext: Warm.    Skin: exposed skin free of rash  Psych: Mood appropriate   Neuro: Normal gait, normal strength in all 4 extremities    Results for orders placed or performed in visit on 05/20/24   CBC with platelets     Status: Normal   Result Value Ref Range    WBC Count 6.2 4.0 - 11.0 10e3/uL    RBC Count 5.44 4.40 - 5.90 10e6/uL    Hemoglobin 15.6 13.3 - 17.7 g/dL    Hematocrit 44.8 40.0 - 53.0 %    MCV 82 78 - 100 fL    MCH 28.7 26.5 - 33.0 pg    MCHC 34.8 31.5 - 36.5 g/dL    RDW 12.2 10.0 - 15.0 %    Platelet Count 199 150 - 450 10e3/uL   Comprehensive metabolic panel     Status: Abnormal   Result Value Ref Range    Sodium 144 135 - 145 mmol/L    Potassium 4.5 3.4 - 5.3 mmol/L    Carbon Dioxide (CO2) 26 22 - 29 mmol/L    Anion Gap 11 7 - 15 mmol/L    Urea Nitrogen 11.1 6.0 - 20.0 mg/dL    Creatinine 0.86 0.67 - " 1.17 mg/dL    GFR Estimate >90 >60 mL/min/1.73m2    Calcium 9.7 8.6 - 10.0 mg/dL    Chloride 107 98 - 107 mmol/L    Glucose 100 (H) 70 - 99 mg/dL    Alkaline Phosphatase 82 40 - 150 U/L    AST 20 0 - 45 U/L    ALT 12 0 - 70 U/L    Protein Total 7.6 6.4 - 8.3 g/dL    Albumin 5.0 3.5 - 5.2 g/dL    Bilirubin Total 0.8 <=1.2 mg/dL   Keppra (Levetiracetam) Level     Status: Normal   Result Value Ref Range    Keppra (Levetiracetam) Level 20.8 10.0 - 40.0  g/mL         Assessment and Plan   1. Nonintractable generalized idiopathic epilepsy without status epilepticus (H)  Epilepsy has been under good control for years with Keppra 1000 mg twice a day  He has not seen a neurologist in the past couple of years due to his neurologist retiring  I recommend he continue Keppra 1000 mg twice a day  We will get annual medication management labs today  I have referred him to neurology to reestablish care which will likely be an annual visit  He will call here if he has any difficulty getting an appointment for ongoing neurologic care    - CBC with platelets; Future  - Comprehensive metabolic panel; Future  - Keppra (Levetiracetam) Level; Future  - levETIRAcetam (KEPPRA) 1000 MG tablet; TAKE 1 TABLET BY MOUTH TWICE DAILY  Dispense: 90 tablet; Refill: 3  - Adult Neurology [facility] Referral; Future  - CBC with platelets  - Comprehensive metabolic panel  - Keppra (Levetiracetam) Level    2. Medication management    - CBC with platelets; Future  - Comprehensive metabolic panel; Future  - Keppra (Levetiracetam) Level; Future  - CBC with platelets  - Comprehensive metabolic panel  - Keppra (Levetiracetam) Level    We discussed some alternative options for more healthy diet, and increasing his daily physical activity.  We also discussed options to explore vocational counseling, and  which would be available via this clinic  Follow up: With neurology as referred, here at [facility] in 6 months, earlier as needed    Options  for treatment and follow-up care were reviewed with the patient and/or guardian. Martín Alvarez and/or guardian engaged in the decision making process and verbalized understanding of the options discussed and agreed with the final plan.    Bartolo Le MD  Faculty - [facility]                    Answers submitted by the patient for this visit:  Patient Health Questionnaire (Submitted on 5/20/2024)  If you checked off any problems, how difficult have these problems made it for you to do your work, take care of things at home, or get along with other people?: Not difficult at all  PHQ9 TOTAL SCORE: 5

## 2024-12-02 DIAGNOSIS — G40.309 NONINTRACTABLE GENERALIZED IDIOPATHIC EPILEPSY WITHOUT STATUS EPILEPTICUS (H): ICD-10-CM

## 2024-12-02 RX ORDER — LEVETIRACETAM 1000 MG/1
TABLET ORAL
Qty: 180 TABLET | Refills: 3 | Status: SHIPPED | OUTPATIENT
Start: 2024-12-02

## 2024-12-02 NOTE — TELEPHONE ENCOUNTER
No protocol for medication, outside RN standing orders. Routing to PCP for review and fill. Raymon FREEMAN

## 2025-02-19 ENCOUNTER — TELEPHONE (OUTPATIENT)
Dept: NEUROLOGY | Facility: CLINIC | Age: 25
End: 2025-02-19

## 2025-02-19 NOTE — TELEPHONE ENCOUNTER
LVM for pt to let him know that is appt with Dr. Monroy on 3-31 will have to be rescheduled. He can be scheduled with another provider as he is a new pt (as long as it's after his EEG scheduled on 3-25).